# Patient Record
Sex: FEMALE | Race: WHITE | NOT HISPANIC OR LATINO | Employment: FULL TIME | URBAN - METROPOLITAN AREA
[De-identification: names, ages, dates, MRNs, and addresses within clinical notes are randomized per-mention and may not be internally consistent; named-entity substitution may affect disease eponyms.]

---
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
## 2021-01-03 ENCOUNTER — HEALTH MAINTENANCE LETTER (OUTPATIENT)
Age: 21
End: 2021-01-03

## 2021-04-25 ENCOUNTER — HEALTH MAINTENANCE LETTER (OUTPATIENT)
Age: 21
End: 2021-04-25

## 2021-10-05 ENCOUNTER — OFFICE VISIT - RIVER FALLS (OUTPATIENT)
Dept: FAMILY MEDICINE | Facility: CLINIC | Age: 21
End: 2021-10-05

## 2021-10-05 ASSESSMENT — MIFFLIN-ST. JEOR: SCORE: 1670.59

## 2021-10-10 ENCOUNTER — HEALTH MAINTENANCE LETTER (OUTPATIENT)
Age: 21
End: 2021-10-10

## 2022-02-11 VITALS
HEIGHT: 63 IN | OXYGEN SATURATION: 99 % | DIASTOLIC BLOOD PRESSURE: 80 MMHG | RESPIRATION RATE: 16 BRPM | WEIGHT: 207.1 LBS | HEART RATE: 81 BPM | BODY MASS INDEX: 36.7 KG/M2 | SYSTOLIC BLOOD PRESSURE: 120 MMHG

## 2022-02-16 ENCOUNTER — MYC MEDICAL ADVICE (OUTPATIENT)
Dept: CARDIOLOGY | Facility: CLINIC | Age: 22
End: 2022-02-16
Payer: COMMERCIAL

## 2022-02-16 DIAGNOSIS — Q87.40 MARFAN'S SYNDROME: Primary | ICD-10-CM

## 2022-02-16 NOTE — LETTER
(Inserted Image. Unable to display)   October 21, 2021    BETH DE GUZMAN  [address] [place] [phone]            Dear BETH,      Thank you for selecting [facility] for your healthcare needs.    Our records indicate you are due for the following services:     Follow-up office visit.    (FYI   Regarding office visits: In some instances, a video visit or telephone visit may be offered as an option.)      To schedule an appointment or if you have further questions, please contact your clinic at [phone].      Powered by [employer] and [employer]    Sincerely,    Dali Pritchard MD

## 2022-02-16 NOTE — PROGRESS NOTES
Chief Complaint    Physical  History of Present Illness      Pt here today for annual exam      Hx of psych testing suggesting possible adhd and asperger syndrome, would like to try Tx      Review of systems is negative except as per HPI including no fevers, chills, sore throat, runny nose, nausea, vomiting, constipation, diarrhea, rash or new skin lesions, chest pain, palpitations, slurred speech, new paresthesia, shortness of breath or wheeze.             Exam:      see vitals listed below      General: alert and oriented ×3 no acute distress.      HEENT: Normocephalic and atraumatic.       Eyes pupils are equal round and reactive to light extraocular motion is intact. normal conjunctiva      Hearing is grossly normal and there is no otorrhea. Tympanic membranes are pearly grey with a normal light reflex.      Nares are patent there is no rhinorrhea.       Mucous membranes are moist and pink.      Chest: has bilateral rise with no increased work of breathing. clear to auscultation without wheezes, rhonchi, or rales.      Cardiovascular: normal perfusion and brisk capillary refill. S1S2 with regular rate and rhythm and no murmurs, gallops or rubs.      Musculoskeletal: no gross focal abnormalities and normal gait.      Neuro: no gross focal abnormalities and memory seems intact.  CN 2-12 are grossly intact.      Psychiatric: speech is clear and coherent and fluent. Patient dressed appropriately for the weather. Mood is appropriate and affect is full.      Abd: no rebound or guarding, normal BS      Skin: no rash or lesion identified      Discussed:      using sunscreen, protecting from sunburn,      taking folic acid 400 mcg daily      refer to usdachooseplate.gov, AHA and ADA for diet and exercise recommendations      consume [id number] mg calcium daily      regular self skin checks      get 150min /week of aerobic exercise         Physical Exam   Vitals & Measurements    HR: 81 (Peripheral)  RR: 16  BP: 120/80   SpO2: 99%     HT: 62.5 in  WT: 207.1 lb  BMI: 37.27   Assessment/Plan       ADHD (attention deficit hyperactivity disorder), inattentive type (F90.0)         Ordered:          atomoxetine, See Instructions, Instructions: 1 cap(s) Oral qam x 1 week then increase to BID, # 60 EA, 1 Refill(s), Type: Maintenance, (Ordered)          Referral (Request), 10/05/21 11:55:00 CDT, Referred to: Behavioral Health, Referred to: Elena Reynoso, ADHD (attention deficit hyperactivity disorder), inattentive type  Asperger's syndrome          Return to Clinic (Request), RFV: follow up mood disorder, adhd and BP, Return in 2 weeks                Asperger's syndrome (F84.5)         Ordered:          Referral (Request), 10/05/21 11:55:00 CDT, Referred to: Behavioral Health, Referred to: Elena Reynoso, ADHD (attention deficit hyperactivity disorder), inattentive type  Asperger's syndrome                Major depression (F32.9)                Orders:         losartan, = 1 tab(s) ( 25 mg ), Oral, daily, # 90 tab(s), 1 Refill(s), Type: Maintenance, Pharmacy: [facility], 1 tab(s) Oral daily, 62.5, in, 10/05/21 10:51:00 CDT, Height Measured, 207.1, lb, 10/05/21 10:51:00 CDT, Weight Measured, (Ordered)      pt will meet with Elena Reynoso regarding mood and consider referral for adhd testing/neuropsych testing, pt will see if she can get a copy of old results,      she has not been taking her losartan or concerta, has a hard time with meds she will try the strattera before adding back the losartan, and we are up in about 2 weeks  Patient Information     Name:BETH DE GUZMAN      Address:      [address] [place] [id number]     Sex:Female     YOB: 2000     [phone]     Emergency Contact:CHAITANYA DE GUZMAN     MRN:[id number]     FIN:[id number]     Location:[facility]     Date of Service:10/05/2021      Primary Care Physician:       NONE ,       Attending Physician:       Macho DALE,  Dali, [phone]  Problem List/Past Medical History    Ongoing     ADHD (attention deficit hyperactivity disorder), inattentive type     Asperger's syndrome     Marfan syndrome     Obesity    Historical     No qualifying data  Medications    losartan 25 mg oral tablet, 25 mg= 1 tab(s), Oral, daily, 1 refills    methylphenidate 54 mg/24 hr oral tablet, extended release, 54 mg= 1 tab(s), Oral, qam    Mirena 52 mg intrauterine device, 52 mg= 1 EA, Intrauteral, once    Strattera 40 mg oral capsule, See Instructions, 1 refills  Allergies    methylphenidate derivative (Nausea, Headache)  Social History     Electronic Cigarette/Vaping      Electronic Cigarette Use: Never.     Tobacco      Never (less than 100 in lifetime)  Immunizations   No Immunizations recorded for patient.

## 2022-02-16 NOTE — NURSING NOTE
Depression Screening Entered On:  10/21/2021 7:33 AM CDT    Performed On:  10/5/2021 7:33 AM CDT by Yanni Du               Depression Screening   Little Interest - Pleasure in Activities :   Several days   Feeling Down, Depressed, Hopeless :   Several days   Initial Depression Screen Score :   2 Score   Poor Appetite or Overeating :   Nearly every day   Trouble Falling or Staying Asleep :   More than half the days   Feeling Tired or Little Energy :   More than half the days   Feeling Bad About Yourself :   More than half the days   Trouble Concentrating :   Several days   Moving or Speaking Slowly :   More than half the days   Thoughts Better Off Dead or Hurting Self :   Not at all   Difficulty at Work, Home, Getting Along :   Somewhat difficult   Detailed Depression Screen Score :   12    Total Depression Screen Score :   14    Yanni Du - 10/21/2021 7:33 AM CDT

## 2022-02-16 NOTE — NURSING NOTE
Comprehensive Intake Entered On:  10/5/2021 10:58 AM CDT    Performed On:  10/5/2021 10:51 AM CDT by Veronika Castellanos               Summary   Chief Complaint :   Physical   Additonal Information :   IUD Mirena   Weight Measured :   207.1 lb(Converted to: 207 lb 2 oz, 93.939 kg)    Height Measured :   62.5 in(Converted to: 5 ft 2 in, 158.75 cm)    Body Mass Index :   37.27 kg/m2 (HI)    Body Surface Area :   2.03 m2   Systolic Blood Pressure :   120 mmHg   Diastolic Blood Pressure :   80 mmHg   Mean Arterial Pressure :   93 mmHg   Peripheral Pulse Rate :   81 bpm   BP Site :   Right arm   BP Method :   Manual   Respiratory Rate :   16 br/min   Oxygen Saturation :   99 %   Veronika Castellanos - 10/5/2021 10:51 AM CDT   Health Status   Allergies Verified? :   Yes   Medication History Verified? :   Yes   Pre-Visit Planning Status :   Completed   Veronika Castellanos - 10/5/2021 10:51 AM CDT   Consents   Consent for Immunization Exchange :   Consent Granted   Consent for Immunizations to Providers :   Consent Granted   Veronika Castellanos - 10/5/2021 10:51 AM CDT   Meds / Allergies   (As Of: 10/5/2021 10:58:33 AM CDT)   Allergies (Active)   No Known Medication Allergies  Estimated Onset Date:   Unspecified ; Created By:   Veronika Castellanos; Reaction Status:   Active ; Category:   Drug ; Substance:   No Known Medication Allergies ; Type:   Allergy ; Updated By:   Veronika Castellanos; Reviewed Date:   10/5/2021 10:55 AM CDT        Medication List   (As Of: 10/5/2021 10:58:33 AM CDT)        Social History   Social History   (As Of: 10/5/2021 10:58:33 AM CDT)   Tobacco:        Never (less than 100 in lifetime)   (Last Updated: 10/5/2021 10:54:46 AM CDT by Veronika Castellanos)          Electronic Cigarette/Vaping:        Electronic Cigarette Use: Never.   (Last Updated: 10/5/2021 10:54:49 AM CDT by Veronika Castellanos)

## 2022-02-16 NOTE — NURSING NOTE
CAGE Assessment Entered On:  10/21/2021 7:34 AM CDT    Performed On:  10/5/2021 7:33 AM CDT by Yanni Du               Assessment   Have you ever felt you should cut down on your drinking :   No   Have people annoyed you by criticizing your drinking :   No   Have you ever felt bad or guilty about your drinking :   No   Have you ever taken a drink first thing in the morning to steady your nerves or get rid of a hangover (Eye-opener) :   No   CAGE Score :   0    Yanni Du - 10/21/2021 7:33 AM CDT

## 2022-02-17 NOTE — TELEPHONE ENCOUNTER
Date: 2/17/2022    Time of Call: 11:53 AM     Diagnosis:  Marfans syndrome     [ TORB ] Ordering provider:  March  Order: TSH labs     Order received by: Emmanuelle Bennett RN     Follow-up/additional notes: added on to future labs per patients request

## 2022-02-22 ENCOUNTER — LAB (OUTPATIENT)
Dept: LAB | Facility: CLINIC | Age: 22
End: 2022-02-22
Payer: COMMERCIAL

## 2022-02-22 DIAGNOSIS — Q87.40 MARFAN'S SYNDROME: ICD-10-CM

## 2022-02-22 LAB
ANION GAP SERPL CALCULATED.3IONS-SCNC: 4 MMOL/L (ref 3–14)
BUN SERPL-MCNC: 12 MG/DL (ref 7–30)
CALCIUM SERPL-MCNC: 9.7 MG/DL (ref 8.5–10.1)
CHLORIDE BLD-SCNC: 108 MMOL/L (ref 94–109)
CO2 SERPL-SCNC: 28 MMOL/L (ref 20–32)
CREAT SERPL-MCNC: 0.7 MG/DL (ref 0.52–1.04)
GFR SERPL CREATININE-BSD FRML MDRD: >90 ML/MIN/1.73M2
GLUCOSE BLD-MCNC: 98 MG/DL (ref 70–99)
POTASSIUM BLD-SCNC: 4.5 MMOL/L (ref 3.4–5.3)
SODIUM SERPL-SCNC: 140 MMOL/L (ref 133–144)
TSH SERPL DL<=0.005 MIU/L-ACNC: 2.12 MU/L (ref 0.4–4)

## 2022-02-22 PROCEDURE — 84443 ASSAY THYROID STIM HORMONE: CPT

## 2022-02-22 PROCEDURE — 80048 BASIC METABOLIC PNL TOTAL CA: CPT

## 2022-02-22 PROCEDURE — 36415 COLL VENOUS BLD VENIPUNCTURE: CPT

## 2022-03-18 ENCOUNTER — ANCILLARY PROCEDURE (OUTPATIENT)
Dept: CARDIOLOGY | Facility: CLINIC | Age: 22
End: 2022-03-18
Attending: INTERNAL MEDICINE
Payer: COMMERCIAL

## 2022-03-18 ENCOUNTER — OFFICE VISIT (OUTPATIENT)
Dept: CARDIOLOGY | Facility: CLINIC | Age: 22
End: 2022-03-18
Attending: DERMATOLOGY
Payer: COMMERCIAL

## 2022-03-18 VITALS
BODY MASS INDEX: 38.16 KG/M2 | DIASTOLIC BLOOD PRESSURE: 78 MMHG | HEART RATE: 78 BPM | SYSTOLIC BLOOD PRESSURE: 118 MMHG | HEIGHT: 62 IN | OXYGEN SATURATION: 95 % | WEIGHT: 207.4 LBS

## 2022-03-18 DIAGNOSIS — Q87.40 MARFAN'S SYNDROME: ICD-10-CM

## 2022-03-18 DIAGNOSIS — F32.9 CURRENT EPISODE OF MAJOR DEPRESSIVE DISORDER WITHOUT PRIOR EPISODE, UNSPECIFIED DEPRESSION EPISODE SEVERITY: ICD-10-CM

## 2022-03-18 DIAGNOSIS — E66.812 CLASS 2 OBESITY WITHOUT SERIOUS COMORBIDITY WITH BODY MASS INDEX (BMI) OF 37.0 TO 37.9 IN ADULT, UNSPECIFIED OBESITY TYPE: Primary | ICD-10-CM

## 2022-03-18 LAB — LVEF ECHO: NORMAL

## 2022-03-18 PROCEDURE — 93306 TTE W/DOPPLER COMPLETE: CPT | Mod: GC | Performed by: INTERNAL MEDICINE

## 2022-03-18 PROCEDURE — 99215 OFFICE O/P EST HI 40 MIN: CPT | Mod: 25 | Performed by: INTERNAL MEDICINE

## 2022-03-18 PROCEDURE — G0463 HOSPITAL OUTPT CLINIC VISIT: HCPCS

## 2022-03-18 ASSESSMENT — PAIN SCALES - GENERAL: PAINLEVEL: NO PAIN (0)

## 2022-03-18 NOTE — NURSING NOTE
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 presenting for follow up.   Vitals were taken and medications reconciled.    Jonathan Neff, EMT  9:51 AM

## 2022-03-18 NOTE — PROGRESS NOTES
CARDIOLOGY CONSULTATION:    Ms. Rich is a delightful 21-year-old woman who is known to me.   She has a history of FBN1 mutation.  We also follow some of her siblings as well.  She had been on losartan 25 mg daily.     Gill was fairly active with sports in high school but has not been as active.  She does have confirmed family members with FBN1 mutations including her sisters, Kerline and Jenae, and her brother, Cory, her father and 2 paternal uncles.  Her mom has multiple sclerosis.      Gill's echo from january 2020 was read as an increase in her aortic dimmension, though it still indexed within normal limits, her sinuses of Valsalva had supposedly increased from 2.7 to 2.9 and her ascending from 2.6 to 3 cm.  We did an MRA 1/6/2020 and the sinuses measured 2.7 and ascending 2.4 CM. Echo 8/2020 sinuses measured 2.6 cm and ascending 2.6 cm.  Echo from 3/2022 sinuses measured 2.7 and ascending 3.0-3.3 (those images are not uploaded).  We are planning to do an MRA now.     Gill is now working at [employer] and is planning to move home.  She is depressed.  She has gained 20 pounds since 8/2020.  She was on ritalin in highschool and stopped that when she went to college and that could be playing a role in her weight gain (was 155 7/2018) that is contributing to her depression    PAST MEDICAL HISTORY:  No past medical history on file.    CURRENT MEDICATIONS:  Current Outpatient Medications   Medication Sig Dispense Refill     levonorgestrel (MIRENA, 52 MG,) 20 MCG/24HR IUD 52 mg       losartan (COZAAR) 25 MG tablet Take 1 tablet (25 mg) by mouth daily 90 tablet 3     methylphenidate (RITALIN) 20 MG tablet 20 mg daily (Patient not taking: Reported on 3/18/2022)  0       PAST SURGICAL HISTORY:  No past surgical history on file.    ALLERGIES  Methylphenidate derivatives    FAMILY HX:  No family history on file.    SOCIAL HX:  Social History     Socioeconomic History     Marital status: Single     Spouse name: None     Number  "of children: None     Years of education: None     Highest education level: None   Occupational History     None   Tobacco Use     Smoking status: Never Smoker     Smokeless tobacco: Never Used   Substance and Sexual Activity     Alcohol use: None     Drug use: None     Sexual activity: None   Other Topics Concern     None   Social History Narrative     None     Social Determinants of Health     Financial Resource Strain: Not on file   Food Insecurity: Not on file   Transportation Needs: Not on file   Physical Activity: Not on file   Stress: Not on file   Social Connections: Not on file   Intimate Partner Violence: Not on file   Housing Stability: Not on file       ROS:  Constitutional: No fever, chills, or sweats. No weight gain/loss.   ENT: No visual disturbance, ear ache, epistaxis, sore throat.   Allergies/Immunologic: Negative.   Respiratory: No cough, hemoptysis.   Cardiovascular: As per HPI.   GI: No nausea, vomiting, hematemesis, melena, or hematochezia.   : No urinary frequency, dysuria, or hematuria.   Integument: Negative.   Psychiatric: Negative.   Neuro: Negative.   Endocrinology: Negative.   Musculoskeletal: No myalgia.    VITAL SIGNS:  /78 (BP Location: Right arm, Patient Position: Sitting, Cuff Size: Adult Large)   Pulse 78   Ht 1.574 m (5' 1.97\")   Wt 94.1 kg (207 lb 6.4 oz)   SpO2 95%   BMI 37.97 kg/m    Body mass index is 37.97 kg/m .  Wt Readings from Last 2 Encounters:   03/18/22 94.1 kg (207 lb 6.4 oz)   10/05/21 93.9 kg (207 lb 1.6 oz)       PHYSICAL EXAM  Gill Rich IS A 21 year old female.in no acute distress.  HEENT: Unremarkable.  Neck: JVP normal.  Carotids +4/4 bilaterally without bruits.  Lungs: CTA.  Cor: RRR. Normal S1 and S2.  Abd: Soft, nontender, nondistended.  Extremities: No C/C/E.    LABS    Lab Results   Component Value Date    WBC 5.0 07/27/2018     Lab Results   Component Value Date    RBC 4.26 07/27/2018     Lab Results   Component Value Date    HGB 12.6 " 07/27/2018     Lab Results   Component Value Date    HCT 38.0 07/27/2018     No components found for: MCT  Lab Results   Component Value Date    MCV 89 07/27/2018     Lab Results   Component Value Date    MCH 29.6 07/27/2018     Lab Results   Component Value Date    MCHC 33.2 07/27/2018     Lab Results   Component Value Date    RDW 11.9 07/27/2018     Lab Results   Component Value Date     07/27/2018      Recent Labs   Lab Test 02/22/22  1134 07/27/18  1330    138   POTASSIUM 4.5 4.6   CHLORIDE 108 104   CO2 28 27   ANIONGAP 4 6   GLC 98 84   BUN 12 12   CR 0.70 0.70   FRAN 9.7 9.3     Recent Labs   Lab Test 01/03/20  1402 07/27/18  1330   CHOL 235* 205*   HDL 87 79   * 113*   TRIG 61 66   NHDL 148* 126*        IMPRESSION, REPORT, PLAN:   1.  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   2.  Sinus of Valsalva 2.7 CM.  Ascending aorta on echo 3.0 CM (doing MRA to check measurement)  3.  Weight gain  4.  Depression       DISCUSSION:  It was a pleasure being involved in the care of Ms. Rich.  We discussed that we will plan to do an MRA to clarify the aorta measurements as the echo was not satisfactory.  The BP is well controlled and will continue with Cozaar.    Her depression is significant and multifactorial.  After discussion put in psychology referral and Prozac 20 mg daily.  Also put in referral for nutrition and weight management.  I will plan to see her back in 4-6 weeks.      All questions were answered.  It was a pleasure to see her.  Please do not hesitate to contact me with any questions or concerns.         MARGARITA MARTINEZ MD    50 minutes face-face, documentation and review of records on day of visit

## 2022-03-18 NOTE — NURSING NOTE
Cardiac Testing: Patient given instructions regarding  MRA. Discussed purpose, preparation, procedure and when to expect results reported back to the patient. Patient demonstrated understanding of this information and agreed to call with further questions or concerns.  Med Reconcile: Reviewed and verified all current medications with the patient. The updated medication list was printed and given to the patient.    New Medication: Patient was educated regarding newly prescribed medication, including discussion of  the indication, administration, side effects, and when to report to MD or RN. Patient demonstrated understanding of this information and agreed to call with further questions or concerns.    Return Appointment: Patient given instructions regarding scheduling next clinic visit. Patient demonstrated understanding of this information and agreed to call with further questions or concerns.    Patient stated she understood all health information given and agreed to call with further questions or concerns.

## 2022-03-18 NOTE — PATIENT INSTRUCTIONS
You were seen today in the Adult Congenital and Cardiovascular Genetics Clinic at the [facility].    Cardiology Providers you saw during your visit:  RASTA Hicks MD    Diagnosis:  marfans    Results:  RASTA Hicks MD reviewed the results of your echo and lab testing today in clinic.    Recommendations:    1. Continue to eat a heart healthy, low salt diet.  2. Continue to get 20-30 minutes of aerobic activity, 4-5 days per week.  Examples of aerobic activity include walking, running, swimming, cycling, etc.  3. Continue to observe good oral hygiene, with regular dental visits.  4. MRA is scheduled, we need a better view of aorta than the echo      SBE prophylaxis:   Yes____  No__x__    Lifelong Bacterial Endocarditis Prophylaxis:  YES____  NO____    If YES is checked, follow the recommendations outlined below:  1. Take antibiotic(s) prior to recommended dental procedures and procedures on the respiratory tract or with infected skin, muscle or bones. SBE prophylaxis is not needed for routine GI and  procedures (ie. Colonoscopy or vaginal delivery)  2. Observe good oral hygiene daily, as advised by your dentist. Get regular professional dental care.  3. Keep cuts clean.  4. Infections should be treated promptly.  5. Symptoms of Infective Endocarditis could include: fever lasting more than 4-5 days or a recurrent fever that initially resolves but returns within 1-2 days)      Exercise restrictions:   Yes__X__  No____         If yes, list restrictions:  Must be allowed to rest if fatigued or SOB      Work restrictions:  Yes____  No_X___         If yes, list restrictions:    FASTING CHOLESTEROL was checked in the last 5 years YES___  NO___ (2020)  Continue to eat a heart healthy, low salt diet.         ____ Fasting lipid panel order today         ____ No changes in medications          ____ I recommend the following changes in your cholesterol medications.:          ____ Please follow up for  cholesterol screening at your primary care physician      Follow-up:  Follow up in 6 weeks with Dr. Hicks    If you have questions or concerns please contact us at:    LEXX SeguraN, RN    Kanchan Thakkar (Scheduling)  Nurse Care Coordinator     Clinic   Adult Congenital and CV Genetics   Adult Congenital and CV Genetic  [facility]  (P) [phone]     (P) [phone]         (F) [phone]        For after hours urgent needs, call [phone] and ask to speak to the Adult Congenital Physician on call.  Mention Job Code 0401.    For emergencies call 911.    [facility]  Mail Code [id number]  [address] [place]  [address]

## 2022-04-18 ENCOUNTER — HOSPITAL ENCOUNTER (OUTPATIENT)
Dept: MRI IMAGING | Facility: CLINIC | Age: 22
Discharge: HOME | End: 2022-04-18
Attending: INTERNAL MEDICINE | Admitting: INTERNAL MEDICINE
Payer: COMMERCIAL

## 2022-04-18 DIAGNOSIS — Q87.40 MARFAN'S SYNDROME: ICD-10-CM

## 2022-04-18 DIAGNOSIS — E66.812 CLASS 2 OBESITY WITHOUT SERIOUS COMORBIDITY WITH BODY MASS INDEX (BMI) OF 37.0 TO 37.9 IN ADULT, UNSPECIFIED OBESITY TYPE: ICD-10-CM

## 2022-04-18 DIAGNOSIS — F32.9 CURRENT EPISODE OF MAJOR DEPRESSIVE DISORDER WITHOUT PRIOR EPISODE, UNSPECIFIED DEPRESSION EPISODE SEVERITY: ICD-10-CM

## 2022-04-18 PROCEDURE — 71555 MRI ANGIO CHEST W OR W/O DYE: CPT

## 2022-04-18 PROCEDURE — 71555 MRI ANGIO CHEST W OR W/O DYE: CPT | Mod: 26 | Performed by: RADIOLOGY

## 2022-04-18 PROCEDURE — 255N000002 HC RX 255 OP 636: Performed by: INTERNAL MEDICINE

## 2022-04-18 PROCEDURE — A9585 GADOBUTROL INJECTION: HCPCS | Performed by: INTERNAL MEDICINE

## 2022-04-18 RX ORDER — GADOBUTROL 604.72 MG/ML
10 INJECTION INTRAVENOUS ONCE
Status: COMPLETED | OUTPATIENT
Start: 2022-04-18 | End: 2022-04-18

## 2022-04-18 RX ADMIN — GADOBUTROL 10 ML: 604.72 INJECTION INTRAVENOUS at 15:27

## 2022-05-20 ENCOUNTER — OFFICE VISIT (OUTPATIENT)
Dept: CARDIOLOGY | Facility: CLINIC | Age: 22
End: 2022-05-20
Attending: INTERNAL MEDICINE
Payer: COMMERCIAL

## 2022-05-20 ENCOUNTER — TELEPHONE (OUTPATIENT)
Dept: BEHAVIORAL HEALTH | Facility: CLINIC | Age: 22
End: 2022-05-20
Payer: COMMERCIAL

## 2022-05-20 VITALS
HEIGHT: 63 IN | DIASTOLIC BLOOD PRESSURE: 70 MMHG | HEART RATE: 71 BPM | WEIGHT: 212.5 LBS | OXYGEN SATURATION: 95 % | BODY MASS INDEX: 37.65 KG/M2 | SYSTOLIC BLOOD PRESSURE: 110 MMHG

## 2022-05-20 DIAGNOSIS — Q87.40 MARFAN SYNDROME: ICD-10-CM

## 2022-05-20 DIAGNOSIS — F32.9 CURRENT EPISODE OF MAJOR DEPRESSIVE DISORDER WITHOUT PRIOR EPISODE, UNSPECIFIED DEPRESSION EPISODE SEVERITY: ICD-10-CM

## 2022-05-20 DIAGNOSIS — E66.812 CLASS 2 OBESITY WITHOUT SERIOUS COMORBIDITY WITH BODY MASS INDEX (BMI) OF 37.0 TO 37.9 IN ADULT, UNSPECIFIED OBESITY TYPE: ICD-10-CM

## 2022-05-20 DIAGNOSIS — Q87.40 MARFAN'S SYNDROME: Primary | ICD-10-CM

## 2022-05-20 PROCEDURE — 93005 ELECTROCARDIOGRAM TRACING: CPT

## 2022-05-20 PROCEDURE — 99214 OFFICE O/P EST MOD 30 MIN: CPT | Performed by: INTERNAL MEDICINE

## 2022-05-20 PROCEDURE — G0463 HOSPITAL OUTPT CLINIC VISIT: HCPCS | Mod: 25

## 2022-05-20 RX ORDER — LOSARTAN POTASSIUM 25 MG/1
25 TABLET ORAL DAILY
Qty: 90 TABLET | Refills: 3 | Status: SHIPPED | OUTPATIENT
Start: 2022-05-20 | End: 2023-01-31

## 2022-05-20 ASSESSMENT — PAIN SCALES - GENERAL: PAINLEVEL: NO PAIN (0)

## 2022-05-20 NOTE — PROGRESS NOTES
CARDIOLOGY CONSULTATION:    Ms. Rich is a delightful 22-year-old woman who is known to me.   She has a history of FBN1 mutation.  We also follow some of her siblings as well.  She had been on losartan 25 mg daily.     Gill was fairly active with sports in high school but has not been as active. She does have confirmed family members with FBN1 mutations including her sisters, Kerline and Jenae, and her brother, Cory, her father and 2 paternal uncles.  Her mom has multiple sclerosis.       MRA 1/6/2020 and the sinuses measured 2.7 and ascending 2.4 CM. We repeated an MRA 4/2022 and her sinuses are 3.0 and ascending aorta 2.7 CM.  We are planning to repeat her MRA next year.       When I saw Gill in March 2022 she was going through some life changes and depression; she had gained some weight and was moving home.  She had some untreated anxiety as well.  We started Prozac 20mg daily and she feels this is helping.  She is switching jobs from [employer] to [employer].    She has not yet met with psychology and nutrition.  Hopes to start walking. BP is controlled. Needs refill on Losartan.     PAST MEDICAL HISTORY:  No past medical history on file.    CURRENT MEDICATIONS:  Current Outpatient Medications   Medication Sig Dispense Refill     FLUoxetine (PROZAC) 20 MG capsule Take 1 capsule (20 mg) by mouth daily 90 capsule 3     levonorgestrel (MIRENA, 52 MG,) 20 MCG/24HR IUD 52 mg       losartan (COZAAR) 25 MG tablet Take 1 tablet (25 mg) by mouth daily 90 tablet 3     methylphenidate (RITALIN) 20 MG tablet 20 mg daily (Patient not taking: No sig reported)  0       PAST SURGICAL HISTORY:  No past surgical history on file.    ALLERGIES  Methylphenidate derivatives    FAMILY HX:  No family history on file.    SOCIAL HX:  Social History     Socioeconomic History     Marital status: Single     Spouse name: None     Number of children: None     Years of education: None     Highest education level: None   Tobacco Use     Smoking  "status: Never Smoker     Smokeless tobacco: Never Used       ROS:  Constitutional: No fever, chills, or sweats. No weight gain/loss.   ENT: No visual disturbance, ear ache, epistaxis, sore throat.   Allergies/Immunologic: Negative.   Respiratory: No cough, hemoptysis.   Cardiovascular: As per HPI.   GI: No nausea, vomiting, hematemesis, melena, or hematochezia.   : No urinary frequency, dysuria, or hematuria.   Integument: Negative.   Psychiatric: Negative.   Neuro: Negative.   Endocrinology: Negative.   Musculoskeletal: No myalgia.    VITAL SIGNS:  /70 (BP Location: Right arm, Patient Position: Chair, Cuff Size: Adult Large)   Pulse 71   Ht 1.588 m (5' 2.5\")   Wt 96.4 kg (212 lb 8 oz)   SpO2 95%   BMI 38.25 kg/m    Body mass index is 38.25 kg/m .  Wt Readings from Last 2 Encounters:   05/20/22 96.4 kg (212 lb 8 oz)   03/18/22 94.1 kg (207 lb 6.4 oz)       PHYSICAL EXAM  Gill Rich IS A 22 year old female.in no acute distress.     LABS    Lab Results   Component Value Date    WBC 5.0 07/27/2018     Lab Results   Component Value Date    RBC 4.26 07/27/2018     Lab Results   Component Value Date    HGB 12.6 07/27/2018     Lab Results   Component Value Date    HCT 38.0 07/27/2018     No components found for: MCT  Lab Results   Component Value Date    MCV 89 07/27/2018     Lab Results   Component Value Date    MCH 29.6 07/27/2018     Lab Results   Component Value Date    MCHC 33.2 07/27/2018     Lab Results   Component Value Date    RDW 11.9 07/27/2018     Lab Results   Component Value Date     07/27/2018      Recent Labs   Lab Test 02/22/22  1134 07/27/18  1330    138   POTASSIUM 4.5 4.6   CHLORIDE 108 104   CO2 28 27   ANIONGAP 4 6   GLC 98 84   BUN 12 12   CR 0.70 0.70   FRAN 9.7 9.3     Recent Labs   Lab Test 01/03/20  1402 07/27/18  1330   CHOL 235* 205*   HDL 87 79   * 113*   TRIG 61 66   NHDL 148* 126*         IMPRESSION, REPORT, PLAN:   1.  Marfan syndrome (carrier of FBN1 " mutation with multiple family members also carriers).   2.  Sinus of Valsalva 3 CM.  Ascending aorta 2.7 CM MRA 4/2022  3.  Obesity  4.  Depression/Anxiety        DISCUSSION:  It was a pleasure being involved in the care of Ms. Rich. We discussed that MRA shows that the aorta has increased in size a bit in 30 months, but that her dimensions are still within normal limits.  We will plan to repeat her MRA again in a year.     The BP is well controlled and will continue with Cozaar.     Her depression and anxiety are improved on Prozac, which we will continue.  She will follow up with the appointments with weight management and psychology.      Will see her back in the fall to check in.       All questions were answered.  It was a pleasure to see her.  Please do not hesitate to contact me with any questions or concerns.         MARGARITA MARTINEZ MD    30 minutes face-face, documentation and review of records on day of visit

## 2022-05-20 NOTE — LETTER
5/20/2022      RE: Gill Rich  [address]  [facility] [address]       Dear Colleague,    Thank you for the opportunity to participate in the care of your patient, Gill Rich, at the [facility] [place] at [facility]. Please see a copy of my visit note below.    CARDIOLOGY CONSULTATION:    Ms. Rich is a delightful 22-year-old woman who is known to me.   She has a history of FBN1 mutation.  We also follow some of her siblings as well.  She had been on losartan 25 mg daily.     Gill was fairly active with sports in high school but has not been as active. She does have confirmed family members with FBN1 mutations including her sisters, Kerline and Jenae, and her brother, Cory, her father and 2 paternal uncles.  Her mom has multiple sclerosis.       MRA 1/6/2020 and the sinuses measured 2.7 and ascending 2.4 CM. We repeated an MRA 4/2022 and her sinuses are 3.0 and ascending aorta 2.7 CM.  We are planning to repeat her MRA next year.       When I saw Gill in March 2022 she was going through some life changes and depression; she had gained some weight and was moving home.  She had some untreated anxiety as well.  We started Prozac 20mg daily and she feels this is helping.  She is switching jobs from [employer] to [employer].    She has not yet met with psychology and nutrition.  Hopes to start walking. BP is controlled. Needs refill on Losartan.     PAST MEDICAL HISTORY:  No past medical history on file.    CURRENT MEDICATIONS:  Current Outpatient Medications   Medication Sig Dispense Refill     FLUoxetine (PROZAC) 20 MG capsule Take 1 capsule (20 mg) by mouth daily 90 capsule 3     levonorgestrel (MIRENA, 52 MG,) 20 MCG/24HR IUD 52 mg       losartan (COZAAR) 25 MG tablet Take 1 tablet (25 mg) by mouth daily 90 tablet 3     methylphenidate (RITALIN) 20 MG tablet 20 mg daily (Patient not taking: No sig reported)  0       PAST SURGICAL HISTORY:  No  "past surgical history on file.    ALLERGIES  Methylphenidate derivatives    FAMILY HX:  No family history on file.    SOCIAL HX:  Social History     Socioeconomic History     Marital status: Single     Spouse name: None     Number of children: None     Years of education: None     Highest education level: None   Tobacco Use     Smoking status: Never Smoker     Smokeless tobacco: Never Used       ROS:  Constitutional: No fever, chills, or sweats. No weight gain/loss.   ENT: No visual disturbance, ear ache, epistaxis, sore throat.   Allergies/Immunologic: Negative.   Respiratory: No cough, hemoptysis.   Cardiovascular: As per HPI.   GI: No nausea, vomiting, hematemesis, melena, or hematochezia.   : No urinary frequency, dysuria, or hematuria.   Integument: Negative.   Psychiatric: Negative.   Neuro: Negative.   Endocrinology: Negative.   Musculoskeletal: No myalgia.    VITAL SIGNS:  /70 (BP Location: Right arm, Patient Position: Chair, Cuff Size: Adult Large)   Pulse 71   Ht 1.588 m (5' 2.5\")   Wt 96.4 kg (212 lb 8 oz)   SpO2 95%   BMI 38.25 kg/m    Body mass index is 38.25 kg/m .  Wt Readings from Last 2 Encounters:   05/20/22 96.4 kg (212 lb 8 oz)   03/18/22 94.1 kg (207 lb 6.4 oz)       PHYSICAL EXAM  Gill Rich IS A 22 year old female.in no acute distress.     LABS    Lab Results   Component Value Date    WBC 5.0 07/27/2018     Lab Results   Component Value Date    RBC 4.26 07/27/2018     Lab Results   Component Value Date    HGB 12.6 07/27/2018     Lab Results   Component Value Date    HCT 38.0 07/27/2018     No components found for: MCT  Lab Results   Component Value Date    MCV 89 07/27/2018     Lab Results   Component Value Date    MCH 29.6 07/27/2018     Lab Results   Component Value Date    MCHC 33.2 07/27/2018     Lab Results   Component Value Date    RDW 11.9 07/27/2018     Lab Results   Component Value Date     07/27/2018      Recent Labs   Lab Test 02/22/22  1134 07/27/18  1330 "    138   POTASSIUM 4.5 4.6   CHLORIDE 108 104   CO2 28 27   ANIONGAP 4 6   GLC 98 84   BUN 12 12   CR 0.70 0.70   FRAN 9.7 9.3     Recent Labs   Lab Test 01/03/20  1402 07/27/18  1330   CHOL 235* 205*   HDL 87 79   * 113*   TRIG 61 66   NHDL 148* 126*         IMPRESSION, REPORT, PLAN:   1.  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   2.  Sinus of Valsalva 3 CM.  Ascending aorta 2.7 CM MRA 4/2022  3.  Obesity  4.  Depression/Anxiety        DISCUSSION:  It was a pleasure being involved in the care of Ms. Rich. We discussed that MRA shows that the aorta has increased in size a bit in 30 months, but that her dimensions are still within normal limits.  We will plan to repeat her MRA again in a year.     The BP is well controlled and will continue with Cozaar.     Her depression and anxiety are improved on Prozac, which we will continue.  She will follow up with the appointments with weight management and psychology.      Will see her back in the fall to check in.       All questions were answered.  It was a pleasure to see her.  Please do not hesitate to contact me with any questions or concerns.         MARGARITA MARTINEZ MD    30 minutes face-face, documentation and review of records on day of visit

## 2022-05-20 NOTE — TELEPHONE ENCOUNTER
First attempt to contact pt. Writer left a VM with TC contact info and encouraged a phone call back to schedule initial therapy appointment. Writer will postpone for tomorrow.    Dana Barry  05/20/22  218    ----- Message from Arnulfo Smith sent at 5/20/2022 11:44 AM CDT -----  Regarding: transition clinic referral  Transition Clinic Referral   [place] Only   Limited [place] Availability    Type of Referral:      __X__Therapy  _____Therapy & Medication (Psychiatry next level of care appointment needs to be scheduled)  _____Medication Only (Psychiatry next level of care appointment needs to be scheduled)  _____Diagnostic Assessment Only      Referring Provider Name: Arnulfo Smith    Clinician completing the assessment.     Referring Provider: OTHER: OP Intake    If known, referring provider Phone Number: [phone]  Service Line/Location: Op Intake    Reason for Transition Clinic Referral: Pt has a referral for therapy. Please help bridge the wait until they can get in. Thanks.    Next Level of Care Patient Will Be Transitioned To: Ed Toscano Department: [facility]     Start Date for Next Level of Care Therapy (Required): 10/24/22  Provider Ed Toscano    Location Department: [facility]    Start Date for Next Level of Care Medication (Required): N/A  Provider  Location   Psychiatry cannot see patients who do not have active medical insurance    What Would Be Helpful from the Transition Clinic: Pt has a referral for therapy. Please help bridge the wait until they can get in. Thanks.     Needs: NO    Does Patient Have Access to Technology: yes    Patient E-mail Address: [email or web address]    Current Patient Phone Number: [phone];     Clinician Gender Preference (if applicable): NO    Arnulfo Smith

## 2022-05-20 NOTE — NURSING NOTE
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 presenting for follow up.     Vitals were taken and medications were reconciled. EKG was performed   BENITEZ Madsen  9:53 AM

## 2022-05-20 NOTE — PATIENT INSTRUCTIONS
You were seen today in the Adult Congenital and Cardiovascular Genetics Clinic at the [facility].    Cardiology Providers you saw during your visit:  RASTA Hicks MD    Diagnosis:  Marfans    Results:  RASTA Hicks MD reviewed the results of your EKG, 2 flight stair test,  echo, MRA and lab testing today in clinic.    Recommendations:    Continue to eat a heart healthy, low salt diet.  Continue to get 20-30 minutes of aerobic activity, 4-5 days per week.  Examples of aerobic activity include walking, running, swimming, cycling, etc.  Continue to observe good oral hygiene, with regular dental visits.  We refilled your losartan      SBE prophylaxis:   Yes____  No__x__    Lifelong Bacterial Endocarditis Prophylaxis:  YES____  NO____    If YES is checked, follow the recommendations outlined below:  Take antibiotic(s) prior to recommended dental procedures and procedures on the respiratory tract or with infected skin, muscle or bones. SBE prophylaxis is not needed for routine GI and  procedures (ie. Colonoscopy or vaginal delivery)  Observe good oral hygiene daily, as advised by your dentist. Get regular professional dental care.  Keep cuts clean.  Infections should be treated promptly.  Symptoms of Infective Endocarditis could include: fever lasting more than 4-5 days or a recurrent fever that initially resolves but returns within 1-2 days)      Exercise restrictions:   Yes__X__  No____         If yes, list restrictions:  Must be allowed to rest if fatigued or SOB      Work restrictions:  Yes____  No_X___         If yes, list restrictions:    FASTING CHOLESTEROL was checked in the last 5 years YES__x_  NO___ (2020)  Continue to eat a heart healthy, low salt diet.         ____ Fasting lipid panel order today         ____ No changes in medications          ____ I recommend the following changes in your cholesterol medications.:          ____ Please follow up for cholesterol screening at your primary  care physician      Follow-up:  Follow up with Dr. Hicks in Nov/Dec 2022    If you have questions or concerns please contact us at:    LEXX SeguraN, RN    Kanchan Thakkar (Scheduling)  Nurse Care Coordinator     Clinic   Adult Congenital and CV Genetics   Adult Congenital and CV Genetic  [facility]   [facility]  (P) [phone]     (P) [phone]         (F) [phone]        For after hours urgent needs, call [phone] and ask to speak to the Adult Congenital Physician on call.  Mention Job Code 0401.    For emergencies call 911.    [facility]   Clinics and Surgery Center  Mail Code [id number]  [address] [place] [address]  [id number]

## 2022-05-21 ENCOUNTER — HEALTH MAINTENANCE LETTER (OUTPATIENT)
Age: 22
End: 2022-05-21

## 2022-05-21 ENCOUNTER — TELEPHONE (OUTPATIENT)
Dept: BEHAVIORAL HEALTH | Facility: CLINIC | Age: 22
End: 2022-05-21
Payer: COMMERCIAL

## 2022-05-21 LAB
ATRIAL RATE - MUSE: 65 BPM
DIASTOLIC BLOOD PRESSURE - MUSE: NORMAL MMHG
INTERPRETATION ECG - MUSE: NORMAL
P AXIS - MUSE: 27 DEGREES
PR INTERVAL - MUSE: 158 MS
QRS DURATION - MUSE: 96 MS
QT - MUSE: 372 MS
QTC - MUSE: 386 MS
R AXIS - MUSE: 37 DEGREES
SYSTOLIC BLOOD PRESSURE - MUSE: NORMAL MMHG
T AXIS - MUSE: 19 DEGREES
VENTRICULAR RATE- MUSE: 65 BPM

## 2022-05-21 NOTE — TELEPHONE ENCOUNTER
2nd attempt, writer left voicemail and postponed to tomorrow 5.22.      [facility]  Care Coordinator  5.21    ----- Message from Arnulfo Smith sent at 5/20/2022 11:44 AM CDT -----  Regarding: transition clinic referral  Transition Clinic Referral   [place] Only   Limited [place] Availability    Type of Referral:      __X__Therapy  _____Therapy & Medication (Psychiatry next level of care appointment needs to be scheduled)  _____Medication Only (Psychiatry next level of care appointment needs to be scheduled)  _____Diagnostic Assessment Only      Referring Provider Name: Arnulfo Smith    Clinician completing the assessment.     Referring Provider: OTHER: OP Intake    If known, referring provider Phone Number: [phone]  Service Line/Location: Op Intake    Reason for Transition Clinic Referral: Pt has a referral for therapy. Please help bridge the wait until they can get in. Thanks.    Next Level of Care Patient Will Be Transitioned To: Ed Toscano Department: [facility]     Start Date for Next Level of Care Therapy (Required): 10/24/22  Provider Ed Toscano    Location Department: [facility]    Start Date for Next Level of Care Medication (Required): N/A  Provider  Location   Psychiatry cannot see patients who do not have active medical insurance    What Would Be Helpful from the Transition Clinic: Pt has a referral for therapy. Please help bridge the wait until they can get in. Thanks.     Needs: NO    Does Patient Have Access to Technology: yes    Patient E-mail Address: [email or web address]    Current Patient Phone Number: [phone];     Clinician Gender Preference (if applicable): NO    Arnulfo Smith

## 2022-05-22 ENCOUNTER — TELEPHONE (OUTPATIENT)
Dept: BEHAVIORAL HEALTH | Facility: CLINIC | Age: 22
End: 2022-05-22
Payer: COMMERCIAL

## 2022-05-22 NOTE — TELEPHONE ENCOUNTER
3rd attempt made. Writer left  encouraging pt to call back to schedule therapy. [facility]  Care Coordinator  5.22  ----- Message from Arnulfo Smith sent at 5/20/2022 11:44 AM CDT -----  Regarding: transition clinic referral  Transition Clinic Referral   [place] Only   Limited [place] Availability    Type of Referral:      __X__Therapy  _____Therapy & Medication (Psychiatry next level of care appointment needs to be scheduled)  _____Medication Only (Psychiatry next level of care appointment needs to be scheduled)  _____Diagnostic Assessment Only      Referring Provider Name: Arnulfo Smith    Clinician completing the assessment.     Referring Provider: OTHER: OP Intake    If known, referring provider Phone Number: [phone]  Service Line/Location: Op Intake    Reason for Transition Clinic Referral: Pt has a referral for therapy. Please help bridge the wait until they can get in. Thanks.    Next Level of Care Patient Will Be Transitioned To: Ed Toscano Department: [facility]     Start Date for Next Level of Care Therapy (Required): 10/24/22  Provider Ed Toscano    Location Department: [facility]    Start Date for Next Level of Care Medication (Required): N/A  Provider  Location   Psychiatry cannot see patients who do not have active medical insurance    What Would Be Helpful from the Transition Clinic: Pt has a referral for therapy. Please help bridge the wait until they can get in. Thanks.     Needs: NO    Does Patient Have Access to Technology: yes    Patient E-mail Address: [email or web address]    Current Patient Phone Number: [phone];     Clinician Gender Preference (if applicable): NO    Arnulfo Smith

## 2022-06-20 NOTE — PROGRESS NOTES
"Gill Rich is a 22 year old female who is being evaluated via a billable video visit.      The patient has been notified of following:     \"This video visit will be conducted via a call between you and your physician/provider. We have found that certain health care needs can be provided without the need for an in-person physical exam.  This service lets us provide the care you need with a video conversation.  If a prescription is necessary we can send it directly to your pharmacy.  If lab work is needed we can place an order for that and you can then stop by our lab to have the test done at a later time.    Video visits are billed at different rates depending on your insurance coverage.  Please reach out to your insurance provider with any questions.    If during the course of the call the physician/provider feels a video visit is not appropriate, you will not be charged for this service.\"    Patient has given verbal consent for Video visit? Yes  How would you like to obtain your AVS? MyChart  Will anyone else be joining your video visit? No  {If patient encounters technical issues they should call [phone]      Video-Visit Details    Type of service:  Video Visit    Video Start Time: 10:30 AM  Video End Time: 11:00 Am    Originating Location (pt. Location): Home    Distant Location (provider location):  [facility] [place]     Platform used for Video Visit: SportsMan[employer]    During this virtual visit the patient is located in [place], patient verifies this as the location during the entirety of this visit.     New Weight Management Nutrition Consultation    Gill Rich is a 22 year old female presents today for new weight management nutrition consultation.  Patient referred by Dr. Mimi Michael on June 20, 2022.    Patient with Co-morbidities of obesity including:  Type II DM no  Renal Failure no  Sleep apnea no  Hypertension no   Dyslipidemia no  Joint pain no  Back pain " "no  GERD no     Referred by her cardiologist during a routine visit for Marfan's syndrome.    Anthropometrics:  Estimated body mass index is 38.16 kg/m  as calculated from the following:    Height as of an earlier encounter on 6/21/22: 1.588 m (5' 2.5\").    Weight as of an earlier encounter on 6/21/22: 96.2 kg (212 lb).     70 lbs wt gain over the past 3 years    Medications for Weight Loss:  None     NUTRITION HISTORY  Food allergies: None  Food intolerances: None  Supplements: None  Previous methods of diet modification for weight loss: 1-2 years ago, tried IF 18/6 for 1 week, did not see any wt loss.   She works in  at [employer] store) from 7-4pm, 5 days per week. Less structured with meals on days off.  Living with her parents, they cook dinner and do the grocery shopping for the most part.  She wants to learn how to improve her diet and not dine-out/take-out as much.  Sleep by 12-2 am, up by 6:30am.     Diet Recall:  Breakfast: no breakfast  Lunch: 1:30 pm salad from work or rice bowl from work;   PM snack: couple times per week - crackers, chips    Dinner: 5-6 pm burgers and salad with veggies; 2 slices meat + 1-2 scoop salad and 1-2 scoop veggie (corn, green beans)    HS snack: 10:30-11 pm snack (crackers, chips) or take-out (Mcdonalds, Taco Bell)  Drinks: Regular soda x1/day, no diet soda Propel more often now, Agua fresca (agave). Water occ.   Alcohol: 1-2 drinks x2/weekly  Dining out/take-out: a couple nights per week or less    Physical Activity:  Not discussed today    Nutrition Prescription  Recommended energy/nutrient modification.    Nutrition Diagnosis  Food and nutrition related knowledge deficit r/t lack of prior exposure to nutrition education r/t weight management aeb pt presenting with questions regarding nutrition for wt loss and interest in nutrition education.    Nutrition Intervention  Materials/education provided on hypocaloric diet for weight loss. Discussed " Volumetric eating to help satiety level on fewer calories; portion control and healthy food choices (Plate Method and Volumetrics handouts), meal and snack planning and mindful eating habits.   Discussed hydration needs, and sugar-free beverage alternatives.  Provided education on heart healthy diet including lean protein sources, sources of saturated fat to limit, high-fiber foods to eat more of, and low sodium diet tips.    Discussed using nutrition info of menu at work to help guide food choice.  Discussed importance of proper sleep hygiene for weight mgmt.   Co-developed goals to work towards this month.     Patient demonstrates understanding.    Expected Engagement: good  Follow-Up Plans: meal planning, eating pattern, activity     Nutrition Goals  1) Increase hydration, aim for 2-3L per day.   - Use sugar-free/calorie-free fluids for hydration, e.g. Water, Crystal Lite, [place], Propel, Gatorade zero  2) Plan evening snack ahead of time   - Example: 1/4 cup nuts + 1 slice/1 cheese stick + 1 cup strawberries + 1/2 cup pineapple, 1/2 cup   3) Limit dining out to 1-2 times per week.     The Plate Method  Http://[email or web address]    https://cdn1.sph.[employer].edu/wp-content/uploads/sites/30/2012/09/FCOXam0078.jpg    https://www.cdc.gov/diabetes/managing/eat-well/meal-plan-method.html#plate    Protein Sources   [email or web address]     Carbohydrates  [email or web address]     Mindful Eating  [email or web address]     Tips for a Low Sodium Diet  http://www.[email or web address]       Follow-Up:  1 month, PRN    Time spent with patient: 30 minutes.  Kylah Rincon RD, LD

## 2022-06-21 ENCOUNTER — VIRTUAL VISIT (OUTPATIENT)
Dept: ENDOCRINOLOGY | Facility: CLINIC | Age: 22
End: 2022-06-21
Payer: COMMERCIAL

## 2022-06-21 ENCOUNTER — VIRTUAL VISIT (OUTPATIENT)
Dept: ENDOCRINOLOGY | Facility: CLINIC | Age: 22
End: 2022-06-21

## 2022-06-21 VITALS — BODY MASS INDEX: 37.56 KG/M2 | HEIGHT: 63 IN | WEIGHT: 212 LBS

## 2022-06-21 DIAGNOSIS — E66.9 OBESITY: ICD-10-CM

## 2022-06-21 DIAGNOSIS — Z71.3 NUTRITIONAL COUNSELING: Primary | ICD-10-CM

## 2022-06-21 DIAGNOSIS — E66.812 CLASS 2 OBESITY WITHOUT SERIOUS COMORBIDITY WITH BODY MASS INDEX (BMI) OF 37.0 TO 37.9 IN ADULT, UNSPECIFIED OBESITY TYPE: ICD-10-CM

## 2022-06-21 PROCEDURE — 97802 MEDICAL NUTRITION INDIV IN: CPT | Performed by: DIETITIAN, REGISTERED

## 2022-06-21 PROCEDURE — 99204 OFFICE O/P NEW MOD 45 MIN: CPT | Mod: 95 | Performed by: INTERNAL MEDICINE

## 2022-06-21 NOTE — PROGRESS NOTES
"  New Medical Weight Management Consult    PATIENT:  Gill Rich  MRN:         [id number]  :         2000  DOMINGO:         2022    Dear Dr. Hicks and colleagues,     I had the pleasure of seeing your patient, Gill Rich. Full intake/assessment was done to determine barriers to weight loss success and develop a treatment plan. Gill Rich is a 22 year old female interested in treatment of medical problems associated with excess weight. She has a height of 5' 2.5\", a weight of 212 lbs 0 oz, and the calculated Body mass index is 38.16 kg/m .     She has the following co-morbidities: Marfan's syndrome, ADHD, depression    Was referred by her cardiologist during a routine visit for Marfan's syndrome. Current weight 215 lbs. The patient notes she has gained about 70 lbs since she weighed about 145lbs when she was 19 years old. She attributes the weight gain to less physical activity after stopping high school sports, as well as her depression. States that food has been a source of comfort in the past. She notes her mental health has improved and is now stable since starting Prozac a few months ago, managed by her cardiologist. No history of using other antidepressants. Had been on Ritalin in the past for ADHD but self-discontinued the medication. She does not feel that weight gain was tied to stopping Ritalin.    She works in  at [employer] from 7-4pm for 5 days/week.    Diet recalled:  Breakfast: skipped  Lunch: salad from work around 1PM  Snacks: chips/crackers after work  Dinner: usually takeout ([employer]/Taco Bell) around 11pm-12am  Drinks: regular soda x1/day, no diet soda  Alcohol: 1-2 drinks x2/weekly    She states she does struggle with cravings.  No physical activity besides standing/walking at work.   She sleeps 4-5 hours/night.  No history of T2DM or thyroid disease. TFT's were normal 2022.  No antipsychotic or steroid use.         2022   I have the " "following health issues associated with obesity: None of the above   I have the following symptoms associated with obesity: None of the above       Patient Goals 6/21/2022   I am interested in having a healthier weight to diminish current health problems: Yes   I am interested in having a healthier weight in order to prevent future health problems: Yes   I am interested in having a healthier weight in order to have a future surgery: No       Referring Provider 6/21/2022   Please name the provider who referred you to Medical Weight Management.  If you do not know, please answer: \"I Don't Know\". Dr. Hicks       Weight History 6/21/2022   How concerned are you about your weight? Very Concerned   Would you describe your weight gain as gradual? No   I became overweight: In College   The following factors have contributed to my weight gain:  Mental Health Issues, Change in Schedule, Eating Wrong Types of Food, Eating Too Much, Lack of Exercise, Stress   My lowest weight since age 19 was: 145   My highest weight since age 19 was: 212   I have the following family history of obesity/being overweight:  I am the only one in my immediate family who is overweight   Has anyone in your family had weight loss surgery? No   How has your weight changed over the last year?  Gained   How many pounds? 50       Diet Recall Review with Patient 6/21/2022   Do you typically eat breakfast? No   Do you typically eat lunch? Yes   If you do eat lunch, what types of food do you typically eat?  Salad or rice bowl   Do you typically eat supper? Yes   If you do eat supper, what types of food do you typically eat? Meat, veggies, and bread   Do you typically eat snacks? Yes   If you do snack, what types of food do you typically eat? Chips, sweets, granola bars   Do you like vegetables?  Yes   Do you drink water? No   How many glasses of juice do you drink in a typical day? 1   How many of glasses of milk do you drink in a typical day? 0   If you do " drink milk, what type? N/A   How many 8oz glasses of sugar containing drinks such as Brennan-Aid/sweet tea do you drink in a day? 0   How many cans/bottles of sugar pop/soda/tea/sports drinks do you drink in a day? 2   How many cans/bottles of diet pop/soda/tea or sports drink do you drink in a day? 0   How often do you have a drink of alcohol? 2-3 TImes a Week   If you do drink, how many drinks might you have in a day? 1 or 2       Eating Habits 6/21/2022   Generally, my meals include foods like these: bread, pasta, rice, potatoes, corn, crackers, sweet dessert, pop, or juice. Everyday   Generally, my meals include foods like these: fried meats, brats, burgers, french fries, pizza, cheese, chips, or ice cream. A Few Times a Week   Eat fast food (like [employer], [employer]OPTICA, Taco Bell). A Few Times a Week   Eat at a buffet or sit-down restaurant. Less Than Weekly   Eat most of my meals in front of the TV or computer. A Few Times a Week   Often skip meals, eat at random times, have no regular eating times. Everyday   Rarely sit down for a meal but snack or graze throughout.  Almost Everyday   Eat extra snacks between meals. Everyday   Eat most of my food at the end of the day. Everyday   Eat in the middle of the night or wake up at night to eat. Never   Eat extra snacks to prevent or correct low blood sugar. Never   Eat to prevent acid reflux or stomach pain. Never   Worry about not having enough food to eat. Never   Have you been to the food shelf at least a few times this year? No   I eat when I am depressed. A Few Times a Week   I eat when I am stressed. A Few Times a Week   I eat when I am bored. Almost Everyday   I eat when I am anxious. A Few Times a Week   I eat when I am happy or as a reward. A Few Times a Week   I feel hungry all the time even if I just have eaten. A Few Times a Week   Feeling full is important to me. A Few Times a Week   I finish all the food on my plate even if I am already full. Once a Week    I can't resist eating delicious food or walk past the good food/smell. Less Than Weekly   I eat/snack without noticing that I am eating. Never   I eat when I am preparing the meal. Never   I eat more than usual when I see others eating. A Few Times a Week   I have trouble not eating sweets, ice cream, cookies, or chips if they are around the house. Everyday   I think about food all day. A Few Times a Week   What foods, if any, do you crave? None       Amount of Food 6/21/2022   I make myself vomit what I have eaten or use laxatives to get rid of food. Never   I eat a large amount of food, like a loaf of bread, a box of cookies, a pint/quart of ice cream, all at once. Weekly   I eat a large amount of food even when I am not hungry. Weekly   I eat rapidly. Everyday   I eat alone because I feel embarrassed and do not want others to see how much I have eaten. Never   I eat until I am uncomfortably full. Never   I feel bad, disgusted, or guilty after I overeat. Never   I make myself vomit what I have eaten or use laxatives to get rid of food. Never       Activity/Exercise History 6/21/2022   How much of a typical 12 hour day do you spend sitting? Less Than Half the Day   How much of a typical 12 hour day do you spend lying down? Less Than Half the Day   How much of a typical day do you spend walking/standing? Most of the Day   How many hours (not including work) do you spend on the TV/Video Games/Computer/Tablet/Phone? 2-3 Hours   How many times a week are you active for the purpose of exercise? Never   What keeps you from being more active? Unsure What To Do   How many total minutes do you spend doing some activity for the purpose of exercising when you exercise? Less Than 15 Minutes       PAST MEDICAL HISTORY:  No past medical history on file.    Work/Social History Reviewed With Patient 6/21/2022   My employment status is: Full-Time   My job is: [employer]   How much of your job is spent on the computer or phone?  "Less Than 50%   How many hours do you spend commuting to work daily?  1/2   What is your marital status? /In a Relationship   If in a relationship, is your significant other overweight? Yes   Do you have children? No   If you have children, are they overweight? N/A   Who do you live with?  My parents   Are they supportive of your health goals? Yes   Who does the food shopping?  Mother       Mental Health History Reviewed With Patient 6/21/2022   Have you ever been physically or sexually abused? No   If yes, do you feel that the abuse is affecting your weight? N/A   If yes, would you like to talk to a counselor about the abuse? N/A   How often in the past 2 weeks have you felt little interest or pleasure in doing things? Not at all   Over the past 2 weeks how often have you felt down, depressed, or hopeless? Not at all       Sleep History Reviewed With Patient 6/21/2022   How many hours do you sleep at night? 6   Do you think that you snore loudly or has anybody ever heard you snore loudly (louder than talking or so loud it can be heard behind a shut door)? No   Has anyone seen or heard you stop breathing during your sleep? No   Do you often feel tired, fatigued, or sleepy during the day? Yes   Do you have a TV/Computer in your bedroom? Yes       MEDICATIONS:   Current Outpatient Medications   Medication Sig Dispense Refill     FLUoxetine (PROZAC) 20 MG capsule Take 1 capsule (20 mg) by mouth daily 90 capsule 3     levonorgestrel (MIRENA, 52 MG,) 20 MCG/24HR IUD 52 mg       losartan (COZAAR) 25 MG tablet Take 1 tablet (25 mg) by mouth daily 90 tablet 3       ALLERGIES:   Allergies   Allergen Reactions     Methylphenidate Derivatives Headache and Nausea       PHYSICAL EXAM:  Ht 1.588 m (5' 2.5\")   Wt 96.2 kg (212 lb)   BMI 38.16 kg/m      Waist circumference:      Wt Readings from Last 4 Encounters:   06/21/22 96.2 kg (212 lb)   05/20/22 96.4 kg (212 lb 8 oz)   03/18/22 94.1 kg (207 lb 6.4 oz)   10/05/21 93.9 " kg (207 lb 1.6 oz)     A & O x 3  HEENT: NCAT, mucous membranes moist  No Cushingoid facies  Respirations unlabored  Location of obesity: Mixed Obesity    ASSESSMENT/PLAN:  Gill is a patient with early onset obesity with BMI of 38 currently, +70 lb weight gain in 3-4 years. Likely contributors include depression which was relatively uncontrolled until the past few months, lack of physical activity, suboptimal dietary habits.     No history or signs of endocrine disorders (DM, thyroid disease, Cushing's).  Depression is now well-controlled on Prozac, per pt. Patient would like to focus on diet and lifestyle modifications. We discussed some diet/lifestyle goals today. Pt agreeable to meet with dietician today. Discussed option of adding medical management to aid weight loss in the future if desired, alongside continued lifestyle modifications.     PLAN:    Diet/Lifestyle modification goals:  -Decrease soda from once daily to a few times/week  -Reduce carbohydrate-heavy snacks  -Begin walks a few times/week  -Dietician visit today  -Can discuss medical management in the future if desired    FOLLOW-UP:    -2 to 3 months    History and documentation by Nay Garcia MS3 under direct supervision.    VDO start 0915 am  VDO end 0940 am  VDO duration 25 minutes    I was present with medical student who participated in the service and in the documentation of the note. I have verified the history and personally performed the physical exam and medical decision making. I agree with the assessment and plan of care as documented in the note. Medical student acts as a scribe.    External notes/medical records independently reviewed, labs and imaging independently reviewed, medical management and tests to be discussed/communicated to patient.    Time: I spent 56 minutes spent on the date of the encounter preparing to see patient (including chart review and preparation), obtaining and or reviewing additional medical history,  performing a physical exam and evaluation, documenting clinical information in the electronic health record, independently interpreting results, communicating results to the patient and coordinating care.    Mimi Michael MD  Division of Diabetes and Endocrinology  Department of Medicine  [phone]

## 2022-06-21 NOTE — PROGRESS NOTES
Virtual Visit Check-In    During this virtual visit the patient is located in [place], patient verifies this as the location during the entirety of this visit.     Gill is a 22 year old who is being evaluated via a billable video visit.      How would you like to obtain your AVS? MyChart  If the video visit is dropped, the invitation should be resent by: Text to cell phone:  [phone]  Will anyone else be joining your video visit? No        Video-Visit Details    Type of service:  Video Visit    Originating Location (pt. Location): Home    Distant Location (provider location):  [facility] [place]     Platform used for Video Visit: Mark Pool NREMT

## 2022-06-21 NOTE — LETTER
"6/21/2022       RE: Gill Rich  [address]  [facility] [address]     Dear Colleague,    Thank you for referring your patient, Gill Rich, to the [facility] [place] at [facility]. Please see a copy of my visit note below.    Gill Rich is a 22 year old female who is being evaluated via a billable video visit.      The patient has been notified of following:     \"This video visit will be conducted via a call between you and your physician/provider. We have found that certain health care needs can be provided without the need for an in-person physical exam.  This service lets us provide the care you need with a video conversation.  If a prescription is necessary we can send it directly to your pharmacy.  If lab work is needed we can place an order for that and you can then stop by our lab to have the test done at a later time.    Video visits are billed at different rates depending on your insurance coverage.  Please reach out to your insurance provider with any questions.    If during the course of the call the physician/provider feels a video visit is not appropriate, you will not be charged for this service.\"    Patient has given verbal consent for Video visit? Yes  How would you like to obtain your AVS? MyChart  Will anyone else be joining your video visit? No  {If patient encounters technical issues they should call [phone]      Video-Visit Details    Type of service:  Video Visit    Video Start Time: 10:30 AM  Video End Time: 11:00 Am    Originating Location (pt. Location): Home    Distant Location (provider location):  [facility] [place]     Platform used for Video Visit: [place] [employer] [place]    During this virtual visit the patient is located in [place], patient verifies this as the location during the entirety of this visit.     New Weight Management Nutrition Consultation    Gill Rich is a " "22 year old female presents today for new weight management nutrition consultation.  Patient referred by Dr. Mimi Michael on June 20, 2022.    Patient with Co-morbidities of obesity including:  Type II DM no  Renal Failure no  Sleep apnea no  Hypertension no   Dyslipidemia no  Joint pain no  Back pain no  GERD no     Referred by her cardiologist during a routine visit for Marfan's syndrome.    Anthropometrics:  Estimated body mass index is 38.16 kg/m  as calculated from the following:    Height as of an earlier encounter on 6/21/22: 1.588 m (5' 2.5\").    Weight as of an earlier encounter on 6/21/22: 96.2 kg (212 lb).     70 lbs wt gain over the past 3 years    Medications for Weight Loss:  None     NUTRITION HISTORY  Food allergies: None  Food intolerances: None  Supplements: None  Previous methods of diet modification for weight loss: 1-2 years ago, tried IF 18/6 for 1 week, did not see any wt loss.   She works in  at [employer]) from 7-4pm, 5 days per week. Less structured with meals on days off.  Living with her parents, they cook dinner and do the grocery shopping for the most part.  She wants to learn how to improve her diet and not dine-out/take-out as much.  Sleep by 12-2 am, up by 6:30am.     Diet Recall:  Breakfast: no breakfast  Lunch: 1:30 pm salad from work or rice bowl from work;   PM snack: couple times per week - crackers, chips    Dinner: 5-6 pm burgers and salad with veggies; 2 slices meat + 1-2 scoop salad and 1-2 scoop veggie (corn, green beans)    HS snack: 10:30-11 pm snack (crackers, chips) or take-out (Mcdonalds, Taco Bell)  Drinks: Regular soda x1/day, no diet soda Propel more often now, Agua fresca (agave). Water occ.   Alcohol: 1-2 drinks x2/weekly  Dining out/take-out: a couple nights per week or less    Physical Activity:  Not discussed today    Nutrition Prescription  Recommended energy/nutrient modification.    Nutrition Diagnosis  Food and " nutrition related knowledge deficit r/t lack of prior exposure to nutrition education r/t weight management aeb pt presenting with questions regarding nutrition for wt loss and interest in nutrition education.    Nutrition Intervention  Materials/education provided on hypocaloric diet for weight loss. Discussed Volumetric eating to help satiety level on fewer calories; portion control and healthy food choices (Plate Method and Volumetrics handouts), meal and snack planning and mindful eating habits.   Discussed hydration needs, and sugar-free beverage alternatives.  Provided education on heart healthy diet including lean protein sources, sources of saturated fat to limit, high-fiber foods to eat more of, and low sodium diet tips.    Discussed using nutrition info of menu at work to help guide food choice.  Discussed importance of proper sleep hygiene for weight mgmt.   Co-developed goals to work towards this month.     Patient demonstrates understanding.    Expected Engagement: good  Follow-Up Plans: meal planning, eating pattern, activity     Nutrition Goals  1) Increase hydration, aim for 2-3L per day.   - Use sugar-free/calorie-free fluids for hydration, e.g. Water, Crystal Lite, Burt, Propel, Gatorade zero  2) Plan evening snack ahead of time   - Example: 1/4 cup nuts + 1 slice/1 cheese stick + 1 cup strawberries + 1/2 cup pineapple, 1/2 cup   3) Limit dining out to 1-2 times per week.     The Plate Method  Http://www.Science/095626.pdf    https://cdn1.sph.[place].edu/wp-content/uploads/sites/30/2012/09/HUMYty2049.jpg    https://www.cdc.gov/diabetes/managing/eat-well/meal-plan-method.html#plate    Protein Sources   http://Science/206650.pdf     Carbohydrates  http://fvfiles.com/395845.pdf     Mindful Eating  http://Science/717928.pdf     Tips for a Low Sodium Diet  http://www.fvfiles.com/254652.pdf       Follow-Up:  1 month, PRN    Time spent with patient: 30 minutes.  Kylah Rincon RD, LD

## 2022-06-21 NOTE — PATIENT INSTRUCTIONS
Johnathan Garland,    Follow-up with RD in 1 month.    Thank you,    Kylah Rincon, RD, LD  If you would like to schedule or reschedule an appointment with the RD, please call [phone]    Nutrition Goals  1) Increase hydration, aim for 2-3L per day.   - Use sugar-free/calorie-free fluids for hydration, e.g. Water, Crystal Lite, [place], Propel, Gatorade zero  2) Plan evening snack ahead of time   - Example: 1/4 cup nuts + 1 slice/1 cheese stick + 1 cup strawberries + 1/2 cup pineapple, 1/2 cup   3) Limit dining out to 1-2 times per week.     The Plate Method  Http://www.ClearCare/336409.pdf    https://cdMaverick Wine Group LLC..sph.[place].edu/wp-content/uploads/sites/30/2012/09/VZSHoa2475.jpg    https://www.cdc.gov/diabetes/managing/eat-well/meal-plan-method.html#plate    Protein Sources   http://ClearCare/654555.pdf     Carbohydrates  http://fvfiles.com/458083.pdf     Mindful Eating  http://ClearCare/540151.pdf     Tips for a Low Sodium Diet  http://www.fvfiles.com/150194.pdf     Interested in working with a health ?  Health coaches work with you to improve your overall health and wellbeing.  They look at the whole person, and may involve discussion of different areas of life, including, but not limited to the four pillars of health (sleep, exercise, nutrition, and stress management). Discuss with your care team if you would like to start working a health .    Health Coaching-3 Pack:    $99 for three health coaching visits    Visits may be done in person or via phone    Coaching is a partnership between the  and the client; Coaches do not prescribe or diagnose    Coaching helps inspire the client to reach his/her personal goals      COMPREHENSIVE WEIGHT MANAGEMENT PROGRAM  VIRTUAL SUPPORT GROUPS    For Support Group Information:      We offer support groups for patients who are working on weight loss and considering, preparing for or have had weight loss surgery.   There is no cost for this opportunity.  You are invited to  "attend the?Virtual Support Groups?provided by any of the following locations:    [facility] via Ygline.com Teams with Mary Betts RN  2.   [place] via WebSideStory with Tj Martines, PhD, LP  3.   [place] via Ygline.com Teams with Kerline Chavira RN  4.   [facility] via Ygline.com Teams with Kerline Andujar [facility]    The following Support Group information can also be found on our website:  [email or web address]      [facility] Weight Loss Surgery Support Group    [facility] Weight Loss Surgery Support Group  The support group is a patient-lead forum that meets monthly to share experiences, encouragement and education. It is open to those who have had weight loss surgery, are scheduled for surgery, and those who are considering surgery.   WHEN: This group meets on the 3rd Wednesday of each month from 5:00PM - 6:00PM virtually using Microsoft Teams.   FACILITATOR: Led by Mary Betts RD, ACE, RN, the program's Clinical Coordinator.   TO REGISTER: Please contact the clinic via [employer] or call the nurse line directly at [phone] to inform our staff that you would like an invite sent to you and to let us know the email you would like the invite sent to. Prior to the meeting, a link with directions on how to join the meeting will be sent to you.    2022 Meetings  January 19: \"Let's Talk\" a time for the group to share.  February 16: \"Let's Talk\" a time for the group to share.  March 16: Guest Speakers: Psychologists, Amparo Menchaca, PhD,LP and Yumiko Camilo, PsyD,LP  April 20: Guest Speaker: Health , Karen Harrison, CHC,CHES, CPT  May 18: Guest Speaker: DietitianBird, DULCE, LP  Leyla 15: \"Let's Talk\" a time for the group to share.  July 20: \"Let's Talk\" a time for the group to share.  August 17: TBA  September 21: TBA  October 19: Guest Speaker: Dr Audie Royal MD Pulmonologist and Sleep Medicine Physician, \"Getting a " "Good Night's Sleep\".  November 16: TBA  December 21: TBA    [facility] Support Groups    Connections: Bariatric Care Support Group?  This is open to all [facility] (and those external to this program) pre- and post- operative bariatric surgery patients as well as their support system.   WHEN: This group meets the 2nd Tuesday of each month from 6:30 PM - 8:00 PM virtually using Microsoft Teams.   FACILITATOR: Led by Tj Martines, Ph.D who is a Licensed Psychologist with the [facility] Comprehensive Weight Management Program.   TO REGISTER: Please send an email to Tj Martines, Ph.D., LP at?[email or web address]?if you would like an invitation to the group and to learn about using Microsoft Teams.    2022 Meetings  January 11: Asuncion Bautista, PharmD, Pharmacy Resident at [facility], \"Medications and Bariatric Surgery\".  February 8: Open Forum  March 8  April 12  May 10  Leyla 14    Connections: Post-Operative Bariatric Surgery Support Group  This is a support group for [facility] bariatric patients (and those external to [facility]) who have had bariatric surgery and are at least 3 months post-surgery.  WHEN: This support group meets the 4th Wednesday of the month from 11:00 AM - 12:00 PM virtually using Microsoft Teams.   FACILITATOR: Led by Certified Bariatric Nurse, Kerline Chavira RN.   TO REGISTER: Please send an email to Kerline at [email or web address] if you would like an invitation to the group and to learn about using Microsoft Teams.    2022 Meetings  January 26  February 23  March 23  April 27  May 25  Leyla 22    [facility] Healthy Lifestyle Virtual Support Group    Healthy Lifestyle Virtual Support Group?  This is 60 minutes of small group guided discussion, support and resources. All are welcome who want a healthy lifestyle.  WHEN: This group meets monthly on a Friday from 12:30 " PM - 1:30 PM virtually using Microsoft Teams.   FACILITATOR: Led by National Board Certified Health and , Kerline Andujar, [facility].   TO REGISTER: Please send an email to Kerline [email or web address] to receive monthly invites to the group or if you have any questions about having a health .  Prior to the meeting, a link with directions on how to join the meeting will be sent to you.    2022 Meetings  MAY 20: OPEN FORUM  JUNE: 24:  Setting Limits and BoundariesKerline  July 29: OPEN FORUM  August 26: Special Guest Registered Dietician Genia Stratton  Sept 30: OPEN FORUM  Oct 28, GraJohana Andrade National Board Certified Health   Nov 18: Navigating How to Eat Around the Holidays with DULCE Davila  Dec 16: Changing your relationship with movement with  Karen National Board Certified Health

## 2022-06-21 NOTE — NURSING NOTE
Chief Complaint   Patient presents with     Consult     New consultation for weight management.         Vitals:    06/21/22 0844   Weight: 212 lb       Body mass index is 38.16 kg/m .      Bean Pool, EMT  Surgery Clinic

## 2022-06-21 NOTE — LETTER
"2022       RE: Gill Rich  [address]  [facility] [address]     Dear Colleague,    Thank you for referring your patient, Gill Rich, to the [facility] [place] at [facility]. Please see a copy of my visit note below.        New Medical Weight Management Consult    PATIENT:  Gill Rich  MRN:         [id number]  :         2000  DOMINGO:         2022    Dear Dr. Hicks and colleagues,     I had the pleasure of seeing your patient, Gill Rich. Full intake/assessment was done to determine barriers to weight loss success and develop a treatment plan. Gill Rich is a 22 year old female interested in treatment of medical problems associated with excess weight. She has a height of 5' 2.5\", a weight of 212 lbs 0 oz, and the calculated Body mass index is 38.16 kg/m .     She has the following co-morbidities: Marfan's syndrome, ADHD, depression    Was referred by her cardiologist during a routine visit for Marfan's syndrome. Current weight 215 lbs. The patient notes she has gained about 70 lbs since she weighed about 145lbs when she was 19 years old. She attributes the weight gain to less physical activity after stopping high school sports, as well as her depression. States that food has been a source of comfort in the past. She notes her mental health has improved and is now stable since starting Prozac a few months ago, managed by her cardiologist. No history of using other antidepressants. Had been on Ritalin in the past for ADHD but self-discontinued the medication. She does not feel that weight gain was tied to stopping Ritalin.    She works in  at [employer]) from 7-4pm for 5 days/week.    Diet recalled:  Breakfast: skipped  Lunch: salad from work around 1PM  Snacks: chips/crackers after work  Dinner: usually takeout ([employer]) around 11pm-12am  Drinks: regular soda " "x1/day, no diet soda  Alcohol: 1-2 drinks x2/weekly    She states she does struggle with cravings.  No physical activity besides standing/walking at work.   She sleeps 4-5 hours/night.  No history of T2DM or thyroid disease. TFT's were normal 2/2022.  No antipsychotic or steroid use.         6/21/2022   I have the following health issues associated with obesity: None of the above   I have the following symptoms associated with obesity: None of the above       Patient Goals 6/21/2022   I am interested in having a healthier weight to diminish current health problems: Yes   I am interested in having a healthier weight in order to prevent future health problems: Yes   I am interested in having a healthier weight in order to have a future surgery: No       Referring Provider 6/21/2022   Please name the provider who referred you to Medical Weight Management.  If you do not know, please answer: \"I Don't Know\". Dr. Hicks       Weight History 6/21/2022   How concerned are you about your weight? Very Concerned   Would you describe your weight gain as gradual? No   I became overweight: In College   The following factors have contributed to my weight gain:  Mental Health Issues, Change in Schedule, Eating Wrong Types of Food, Eating Too Much, Lack of Exercise, Stress   My lowest weight since age 19 was: 145   My highest weight since age 19 was: 212   I have the following family history of obesity/being overweight:  I am the only one in my immediate family who is overweight   Has anyone in your family had weight loss surgery? No   How has your weight changed over the last year?  Gained   How many pounds? 50       Diet Recall Review with Patient 6/21/2022   Do you typically eat breakfast? No   Do you typically eat lunch? Yes   If you do eat lunch, what types of food do you typically eat?  Salad or rice bowl   Do you typically eat supper? Yes   If you do eat supper, what types of food do you typically eat? Meat, veggies, and bread "   Do you typically eat snacks? Yes   If you do snack, what types of food do you typically eat? Chips, sweets, granola bars   Do you like vegetables?  Yes   Do you drink water? No   How many glasses of juice do you drink in a typical day? 1   How many of glasses of milk do you drink in a typical day? 0   If you do drink milk, what type? N/A   How many 8oz glasses of sugar containing drinks such as Brennan-A[employer]/sweet tea do you drink in a day? 0   How many cans/bottles of sugar pop/soda/tea/sports drinks do you drink in a day? 2   How many cans/bottles of diet pop/soda/tea or sports drink do you drink in a day? 0   How often do you have a drink of alcohol? 2-3 TImes a Week   If you do drink, how many drinks might you have in a day? 1 or 2       Eating Habits 6/21/2022   Generally, my meals include foods like these: bread, pasta, rice, potatoes, corn, crackers, sweet dessert, pop, or juice. Everyday   Generally, my meals include foods like these: fried meats, brats, burgers, french fries, pizza, cheese, chips, or ice cream. A Few Times a Week   Eat fast food (like [employer], [employer], [employer]aco [employer]). A Few Times a Week   Eat at a buffet or sit-down restaurant. Less Than Weekly   Eat most of my meals in front of the TV or computer. A Few Times a Week   Often skip meals, eat at random times, have no regular eating times. Everyday   Rarely sit down for a meal but snack or graze throughout.  Almost Everyday   Eat extra snacks between meals. Everyday   Eat most of my food at the end of the day. Everyday   Eat in the middle of the night or wake up at night to eat. Never   Eat extra snacks to prevent or correct low blood sugar. Never   Eat to prevent acid reflux or stomach pain. Never   Worry about not having enough food to eat. Never   Have you been to the food shelf at least a few times this year? No   I eat when I am depressed. A Few Times a Week   I eat when I am stressed. A Few Times a Week   I eat when I am bored. Almost  Everyday   I eat when I am anxious. A Few Times a Week   I eat when I am happy or as a reward. A Few Times a Week   I feel hungry all the time even if I just have eaten. A Few Times a Week   Feeling full is important to me. A Few Times a Week   I finish all the food on my plate even if I am already full. Once a Week   I can't resist eating delicious food or walk past the good food/smell. Less Than Weekly   I eat/snack without noticing that I am eating. Never   I eat when I am preparing the meal. Never   I eat more than usual when I see others eating. A Few Times a Week   I have trouble not eating sweets, ice cream, cookies, or chips if they are around the house. Everyday   I think about food all day. A Few Times a Week   What foods, if any, do you crave? None       Amount of Food 6/21/2022   I make myself vomit what I have eaten or use laxatives to get rid of food. Never   I eat a large amount of food, like a loaf of bread, a box of cookies, a pint/quart of ice cream, all at once. Weekly   I eat a large amount of food even when I am not hungry. Weekly   I eat rapidly. Everyday   I eat alone because I feel embarrassed and do not want others to see how much I have eaten. Never   I eat until I am uncomfortably full. Never   I feel bad, disgusted, or guilty after I overeat. Never   I make myself vomit what I have eaten or use laxatives to get rid of food. Never       Activity/Exercise History 6/21/2022   How much of a typical 12 hour day do you spend sitting? Less Than Half the Day   How much of a typical 12 hour day do you spend lying down? Less Than Half the Day   How much of a typical day do you spend walking/standing? Most of the Day   How many hours (not including work) do you spend on the TV/Video Games/Computer/Tablet/Phone? 2-3 Hours   How many times a week are you active for the purpose of exercise? Never   What keeps you from being more active? Unsure What To Do   How many total minutes do you spend doing some  activity for the purpose of exercising when you exercise? Less Than 15 Minutes       PAST MEDICAL HISTORY:  No past medical history on file.    Work/Social History Reviewed With Patient 6/21/2022   My employment status is: Full-Time   My job is: [employer]   How much of your job is spent on the computer or phone? Less Than 50%   How many hours do you spend commuting to work daily?  1/2   What is your marital status? /In a Relationship   If in a relationship, is your significant other overweight? Yes   Do you have children? No   If you have children, are they overweight? N/A   Who do you live with?  My parents   Are they supportive of your health goals? Yes   Who does the food shopping?  Mother       Mental Health History Reviewed With Patient 6/21/2022   Have you ever been physically or sexually abused? No   If yes, do you feel that the abuse is affecting your weight? N/A   If yes, would you like to talk to a counselor about the abuse? N/A   How often in the past 2 weeks have you felt little interest or pleasure in doing things? Not at all   Over the past 2 weeks how often have you felt down, depressed, or hopeless? Not at all       Sleep History Reviewed With Patient 6/21/2022   How many hours do you sleep at night? 6   Do you think that you snore loudly or has anybody ever heard you snore loudly (louder than talking or so loud it can be heard behind a shut door)? No   Has anyone seen or heard you stop breathing during your sleep? No   Do you often feel tired, fatigued, or sleepy during the day? Yes   Do you have a TV/Computer in your bedroom? Yes       MEDICATIONS:   Current Outpatient Medications   Medication Sig Dispense Refill     FLUoxetine (PROZAC) 20 MG capsule Take 1 capsule (20 mg) by mouth daily 90 capsule 3     levonorgestrel (MIRENA, 52 MG,) 20 MCG/24HR IUD 52 mg       losartan (COZAAR) 25 MG tablet Take 1 tablet (25 mg) by mouth daily 90 tablet 3       ALLERGIES:   Allergies   Allergen  "Reactions     Methylphenidate Derivatives Headache and Nausea       PHYSICAL EXAM:  Ht 1.588 m (5' 2.5\")   Wt 96.2 kg (212 lb)   BMI 38.16 kg/m      Waist circumference:      Wt Readings from Last 4 Encounters:   06/21/22 96.2 kg (212 lb)   05/20/22 96.4 kg (212 lb 8 oz)   03/18/22 94.1 kg (207 lb 6.4 oz)   10/05/21 93.9 kg (207 lb 1.6 oz)     A & O x 3  HEENT: NCAT, mucous membranes moist  No Cushingoid facies  Respirations unlabored  Location of obesity: Mixed Obesity    ASSESSMENT/PLAN:  Gill is a patient with early onset obesity with BMI of 38 currently, +70 lb weight gain in 3-4 years. Likely contributors include depression which was relatively uncontrolled until the past few months, lack of physical activity, suboptimal dietary habits.     No history or signs of endocrine disorders (DM, thyroid disease, Cushing's).  Depression is now well-controlled on Prozac, per pt. Patient would like to focus on diet and lifestyle modifications. We discussed some diet/lifestyle goals today. Pt agreeable to meet with dietician today. Discussed option of adding medical management to aid weight loss in the future if desired, alongside continued lifestyle modifications.     PLAN:    Diet/Lifestyle modification goals:  -Decrease soda from once daily to a few times/week  -Reduce carbohydrate-heavy snacks  -Begin walks a few times/week  -Dietician visit today  -Can discuss medical management in the future if desired    FOLLOW-UP:    -2 to 3 months    History and documentation by Nay Garcia MS3 under direct supervision.    VDO start 0915 am  VDO end 0940 am  VDO duration 25 minutes    I was present with medical student who participated in the service and in the documentation of the note. I have verified the history and personally performed the physical exam and medical decision making. I agree with the assessment and plan of care as documented in the note. Medical student acts as a scribe.    External notes/medical records " independently reviewed, labs and imaging independently reviewed, medical management and tests to be discussed/communicated to patient.    Time: I spent 56 minutes spent on the date of the encounter preparing to see patient (including chart review and preparation), obtaining and or reviewing additional medical history, performing a physical exam and evaluation, documenting clinical information in the electronic health record, independently interpreting results, communicating results to the patient and coordinating care.    Mimi Michael MD  Division of Diabetes and Endocrinology  Department of Medicine  [phone]

## 2022-06-30 ENCOUNTER — TELEPHONE (OUTPATIENT)
Dept: ENDOCRINOLOGY | Facility: CLINIC | Age: 22
End: 2022-06-30

## 2022-07-29 ENCOUNTER — TELEPHONE (OUTPATIENT)
Dept: CARDIOLOGY | Facility: CLINIC | Age: 22
End: 2022-07-29

## 2022-07-29 NOTE — LETTER
August 2, 2022      Gill Rich  [address]  [facility] [address]        Dear Gill,      Our records indicate that it is time for you to schedule your return visit with the [facility] in the CARDIOVASCULAR CONGENITAL CLINIC at the [facility] ([facility]).      To make your appointment, please call: [phone], Monday - Friday, 8 A.M. - 4:30 P.M (Central Time Zone).    Please check with your insurance company about your benefits.  Some insurance plans provide different coverage levels for services at [facility] hospital-based clinics.     We look forward to hearing from you.    Sincerely,    Lissa GRAHAM   Clinic Coordinator  [facility]  Office: [phone]  Fax: [phone]

## 2022-09-18 ENCOUNTER — HEALTH MAINTENANCE LETTER (OUTPATIENT)
Age: 22
End: 2022-09-18

## 2022-12-21 NOTE — PATIENT INSTRUCTIONS
You were seen today in the Adult Congenital and Cardiovascular Genetics Clinic at the [facility].    Cardiology Providers you saw during your visit:  RASTA Hicks MD    Diagnosis:  Marfans    Results:  RASTA Hicks MD reviewed the results of your EKG testing today in clinic.    Recommendations for you:    Referral made to internal medicine       General Cardiac Recommendations:  Continue to eat a heart healthy, low salt diet.  Continue to get 20-30 minutes of aerobic activity, 4-5 days per week.  Examples of aerobic activity include walking, running, swimming, cycling, etc.  Continue to observe good oral hygiene, with regular dental visits.      SBE prophylaxis:   Yes____  No__x__      Exercise restrictions:   Yes__X__  No____         If yes, list restrictions:  Must be allowed to rest if fatigued or SOB      Work restrictions:  Yes____  No_X___         If yes, list restrictions:    FASTING CHOLESTEROL was checked in the last 5 years YES__x_  NO___ (2020)  If no, please follow up with your primary care physician. You should have a cholesterol screening every 5 years.      Follow-up:  Follow up with Dr Hicks this spring with a chest MRA prior.     If you have questions or concerns please contact us at:    Emmanuelle Bennett RN, BSN   Kanchan Thakkar (Scheduling)  Nurse Care Coordinator     Clinic   Adult Congenital and CV Genetics   Adult Congenital and CV Genetic  [facility]   [facility]  (P) [phone]     (P) [phone]            (F) [phone]        For after hours urgent needs, call [phone] and ask to speak to the Adult Congenital Physician on call.  Mention Job Code 0401.    For emergencies call 911.    [facility]  [facility]   Clinics and Surgery Center  Mail Code [id number]  [address] [place] [address]  [id number]

## 2022-12-23 ENCOUNTER — VIRTUAL VISIT (OUTPATIENT)
Dept: CARDIOLOGY | Facility: CLINIC | Age: 22
End: 2022-12-23
Attending: INTERNAL MEDICINE
Payer: COMMERCIAL

## 2022-12-23 DIAGNOSIS — Q87.40 MARFAN'S SYNDROME: ICD-10-CM

## 2022-12-23 PROCEDURE — 99215 OFFICE O/P EST HI 40 MIN: CPT | Mod: 95 | Performed by: INTERNAL MEDICINE

## 2022-12-23 RX ORDER — DEXTROAMPHETAMINE SACCHARATE, AMPHETAMINE ASPARTATE MONOHYDRATE, DEXTROAMPHETAMINE SULFATE AND AMPHETAMINE SULFATE 5; 5; 5; 5 MG/1; MG/1; MG/1; MG/1
CAPSULE, EXTENDED RELEASE ORAL
COMMUNITY
Start: 2022-09-27 | End: 2023-05-01

## 2022-12-23 NOTE — NURSING NOTE
Chief Complaint   Patient presents with     Follow Up     No other vitals to report today     Uvaldo Harmon, VF/CMA

## 2022-12-23 NOTE — LETTER
12/23/2022      RE: Gill Rich  [address]  [facility] [address]       Dear Colleague,    Thank you for the opportunity to participate in the care of your patient, Gill Rich, at the [facility] CLINIC [place] [facility]. Please see a copy of my visit note below.    CARDIOLOGY CONSULTATION:       Ms. Rich is a delightful 22-year-old woman who is known to me.   She has a history of FBN1 mutation.  We also follow some of her siblings as well.  She had been on losartan 25 mg daily.     Gill was fairly active with sports in high school but has not been as active. She does have confirmed family members with FBN1 mutations including her sisters, Kerline and Jenae, and her brother, Cory, her father and 2 paternal uncles.  Her mom has multiple sclerosis.       MRA 1/6/2020 and the sinuses measured 2.7 and ascending 2.4 CM. We repeated an MRA 4/2022 and her sinuses are 3.0 and ascending aorta 2.7 CM.  We are planning to repeat her MRA next year.       When I saw Gill in March 2022 she was going through some life changes and depression; she had gained some weight and was moving home.  She had some untreated anxiety as well.  We started Prozac 20mg daily. She is working and [employer] now and is back on ADHD meds and has broken up with her boyfriend and she has lost 30 pounds since August!  She has a puppy that she is walking.  Her spirits are so much better.      Her dad was just diagnosed with Covid today and has no primary and gets care through our cardiology clinic. They were unable to get help through an online pharmacist evaluation and he has multiple risk factors for severe infection going into the holiday weekend.        PAST MEDICAL HISTORY:  No past medical history on file.    CURRENT MEDICATIONS:  Current Outpatient Medications   Medication Sig Dispense Refill     amphetamine-dextroamphetamine (ADDERALL XR) 20 MG 24 hr capsule        FLUoxetine  (PROZAC) 20 MG capsule Take 1 capsule (20 mg) by mouth daily 90 capsule 3     levonorgestrel (MIRENA, 52 MG,) 20 MCG/24HR IUD 52 mg       losartan (COZAAR) 25 MG tablet Take 1 tablet (25 mg) by mouth daily 90 tablet 3       PAST SURGICAL HISTORY:  No past surgical history on file.    ALLERGIES  Methylphenidate derivatives    FAMILY HX:  No family history on file.    SOCIAL HX:  Social History     Socioeconomic History     Marital status: Single     Spouse name: None     Number of children: None     Years of education: None     Highest education level: None   Tobacco Use     Smoking status: Never     Smokeless tobacco: Never       ROS:  Constitutional: No fever, chills, or sweats. No weight gain/loss.   ENT: No visual disturbance, ear ache, epistaxis, sore throat.   Allergies/Immunologic: Negative.   Respiratory: No cough, hemoptysis.   Cardiovascular: As per HPI.   GI: No nausea, vomiting, hematemesis, melena, or hematochezia.   : No urinary frequency, dysuria, or hematuria.   Integument: Negative.   Psychiatric: Negative.   Neuro: Negative.   Endocrinology: Negative.   Musculoskeletal: No myalgia.    VITAL SIGNS:  There were no vitals taken for this visit.  There is no height or weight on file to calculate BMI.  Wt Readings from Last 2 Encounters:   06/21/22 96.2 kg (212 lb)   05/20/22 96.4 kg (212 lb 8 oz)       LABS    Lab Results   Component Value Date    WBC 5.0 07/27/2018     Lab Results   Component Value Date    RBC 4.26 07/27/2018     Lab Results   Component Value Date    HGB 12.6 07/27/2018     Lab Results   Component Value Date    HCT 38.0 07/27/2018     No components found for: MCT  Lab Results   Component Value Date    MCV 89 07/27/2018     Lab Results   Component Value Date    MCH 29.6 07/27/2018     Lab Results   Component Value Date    MCHC 33.2 07/27/2018     Lab Results   Component Value Date    RDW 11.9 07/27/2018     Lab Results   Component Value Date     07/27/2018      Recent Labs   Lab  Test 02/22/22  1134 07/27/18  1330    138   POTASSIUM 4.5 4.6   CHLORIDE 108 104   CO2 28 27   ANIONGAP 4 6   GLC 98 84   BUN 12 12   CR 0.70 0.70   FRAN 9.7 9.3     Recent Labs   Lab Test 01/03/20  1402 07/27/18  1330   CHOL 235* 205*   HDL 87 79   * 113*   TRIG 61 66   NHDL 148* 126*        IMPRESSION, REPORT, PLAN:   1.  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   2.  Sinus of Valsalva 3 CM.  Ascending aorta 2.7 CM MRA 4/2022  3.  Obesity  4.  Depression/Anxiety  5.  ADHD        DISCUSSION:  It was a pleasure being involved in the care of Ms. Rich. We discussed that MRA needs to be repeated in the Spring.      I am so glad that she is doing so well.  She is visibly happier.  She has lost 30 pounds since August!  Will help her to get a new primary so she can get her ADHD meds and primary care taken care of.  She has about a month supply left and if she cant get in before a month I will refill till she can.      If she tests positive for covid, will give Paxlovid.     The BP is well controlled and will continue with Cozaar.       All questions were answered.  It was a pleasure to see her.  Please do not hesitate to contact me with any questions or concerns.         MARGARITA MARTINEZ MD    45 minutes face-face, documentation and review of records on day of visit

## 2022-12-23 NOTE — PROGRESS NOTES
Gill is a 22 year old who is being evaluated via a billable video visit.      Pt states in [place] for visit.     How would you like to obtain your AVS? [facility]  If the video visit is dropped, the invitation should be resent by: Text to cell phone: [phone]  Will anyone else be joining your video visit?   pts mother  Uvaldo Harmon, VF/CMA          Video-Visit Details    Type of service:  Video Visit     Originating Location (pt. Location): home    Distant Location (provider location):  [facility]   Platform used for Video Visit: [employer]ge     CARDIOLOGY CONSULTATION:       Ms. Rich is a delightful 22-year-old woman who is known to me.   She has a history of FBN1 mutation.  We also follow some of her siblings as well.  She had been on losartan 25 mg daily.     Gill was fairly active with sports in high school but has not been as active. She does have confirmed family members with FBN1 mutations including her sisters, Kerline and Jenae, and her brother, Cory, her father and 2 paternal uncles.  Her mom has multiple sclerosis.       MRA 1/6/2020 and the sinuses measured 2.7 and ascending 2.4 CM. We repeated an MRA 4/2022 and her sinuses are 3.0 and ascending aorta 2.7 CM.  We are planning to repeat her MRA next year.       When I saw Gill in March 2022 she was going through some life changes and depression; she had gained some weight and was moving home.  She had some untreated anxiety as well.  We started Prozac 20mg daily. She is working and [employer] now and is back on ADHD meds and has broken up with her boyfriend and she has lost 30 pounds since August!  She has a puppy that she is walking.  Her spirits are so much better.      Her dad was just diagnosed with Covid today and has no primary and gets care through our cardiology clinic. They were unable to get help through an online pharmacist evaluation and he has multiple risk factors for severe infection going into the holiday weekend.        PAST MEDICAL  HISTORY:  No past medical history on file.    CURRENT MEDICATIONS:  Current Outpatient Medications   Medication Sig Dispense Refill     amphetamine-dextroamphetamine (ADDERALL XR) 20 MG 24 hr capsule        FLUoxetine (PROZAC) 20 MG capsule Take 1 capsule (20 mg) by mouth daily 90 capsule 3     levonorgestrel (MIRENA, 52 MG,) 20 MCG/24HR IUD 52 mg       losartan (COZAAR) 25 MG tablet Take 1 tablet (25 mg) by mouth daily 90 tablet 3       PAST SURGICAL HISTORY:  No past surgical history on file.    ALLERGIES  Methylphenidate derivatives    FAMILY HX:  No family history on file.    SOCIAL HX:  Social History     Socioeconomic History     Marital status: Single     Spouse name: None     Number of children: None     Years of education: None     Highest education level: None   Tobacco Use     Smoking status: Never     Smokeless tobacco: Never       ROS:  Constitutional: No fever, chills, or sweats. No weight gain/loss.   ENT: No visual disturbance, ear ache, epistaxis, sore throat.   Allergies/Immunologic: Negative.   Respiratory: No cough, hemoptysis.   Cardiovascular: As per HPI.   GI: No nausea, vomiting, hematemesis, melena, or hematochezia.   : No urinary frequency, dysuria, or hematuria.   Integument: Negative.   Psychiatric: Negative.   Neuro: Negative.   Endocrinology: Negative.   Musculoskeletal: No myalgia.    VITAL SIGNS:  There were no vitals taken for this visit.  There is no height or weight on file to calculate BMI.  Wt Readings from Last 2 Encounters:   06/21/22 96.2 kg (212 lb)   05/20/22 96.4 kg (212 lb 8 oz)       LABS    Lab Results   Component Value Date    WBC 5.0 07/27/2018     Lab Results   Component Value Date    RBC 4.26 07/27/2018     Lab Results   Component Value Date    HGB 12.6 07/27/2018     Lab Results   Component Value Date    HCT 38.0 07/27/2018     No components found for: MCT  Lab Results   Component Value Date    MCV 89 07/27/2018     Lab Results   Component Value Date    MCH 29.6  07/27/2018     Lab Results   Component Value Date    MCHC 33.2 07/27/2018     Lab Results   Component Value Date    RDW 11.9 07/27/2018     Lab Results   Component Value Date     07/27/2018      Recent Labs   Lab Test 02/22/22  1134 07/27/18  1330    138   POTASSIUM 4.5 4.6   CHLORIDE 108 104   CO2 28 27   ANIONGAP 4 6   GLC 98 84   BUN 12 12   CR 0.70 0.70   FRAN 9.7 9.3     Recent Labs   Lab Test 01/03/20  1402 07/27/18  1330   CHOL 235* 205*   HDL 87 79   * 113*   TRIG 61 66   NHDL 148* 126*        IMPRESSION, REPORT, PLAN:   1.  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   2.  Sinus of Valsalva 3 CM.  Ascending aorta 2.7 CM MRA 4/2022  3.  Obesity  4.  Depression/Anxiety  5.  ADHD        DISCUSSION:  It was a pleasure being involved in the care of Ms. Rich. We discussed that MRA needs to be repeated in the Spring.      I am so glad that she is doing so well.  She is visibly happier.  She has lost 30 pounds since August!  Will help her to get a new primary so she can get her ADHD meds and primary care taken care of.  She has about a month supply left and if she cant get in before a month I will refill till she can.      If she tests positive for covid, will give Paxlovid.     The BP is well controlled and will continue with Cozaar.       All questions were answered.  It was a pleasure to see her.  Please do not hesitate to contact me with any questions or concerns.         MARGARITA MARTINEZ MD    45 minutes face-face, documentation and review of records on day of visit

## 2022-12-29 ENCOUNTER — TELEPHONE (OUTPATIENT)
Dept: CARDIOLOGY | Facility: CLINIC | Age: 22
End: 2022-12-29
Payer: COMMERCIAL

## 2022-12-29 NOTE — LETTER
January 26, 2023      Gill Rich  [address]  [place] [address]    Dear Gill,    Our records indicate that it is time for you to schedule your return visit with the [facility] in the CARDIOVASCULAR CONGENITAL CLINIC at the [facility] ([facility]).      To make your appointment, please call: [phone] or respond to this InterStelNet message, Monday - Friday, 8 A.M. - 4:30 P.M (Central Time Zone).    Please check with your insurance company about your benefits.  Some insurance plans provide different coverage levels for services at [facility] hospital-based clinics.     We look forward to hearing from you.    Sincerely,    Lissa MARQUEZ .  Clinic Coordinator  [facility]  Office: [phone]  Fax: [phone]

## 2023-01-26 NOTE — TELEPHONE ENCOUNTER
2nd attempt to reach out to pt to schedule follow up with March with MRA chest prior, no answer, LVM ,sent letter    [facility] ATTEMPTS call [phone] if pt wishes to be scheduled.

## 2023-01-31 ENCOUNTER — VIRTUAL VISIT (OUTPATIENT)
Dept: FAMILY MEDICINE | Facility: CLINIC | Age: 23
End: 2023-01-31
Payer: COMMERCIAL

## 2023-01-31 DIAGNOSIS — F90.0 ATTENTION-DEFICIT HYPERACTIVITY DISORDER, PREDOMINANTLY INATTENTIVE TYPE: Primary | ICD-10-CM

## 2023-01-31 DIAGNOSIS — Q87.40 MARFAN SYNDROME: ICD-10-CM

## 2023-01-31 DIAGNOSIS — F32.9 CURRENT EPISODE OF MAJOR DEPRESSIVE DISORDER WITHOUT PRIOR EPISODE, UNSPECIFIED DEPRESSION EPISODE SEVERITY: ICD-10-CM

## 2023-01-31 PROBLEM — F90.9 ADHD (ATTENTION DEFICIT HYPERACTIVITY DISORDER): Status: ACTIVE | Noted: 2023-01-31

## 2023-01-31 PROBLEM — E66.9 OBESITY: Status: ACTIVE | Noted: 2023-01-31

## 2023-01-31 PROBLEM — F84.5 ASPERGER'S DISORDER: Status: ACTIVE | Noted: 2023-01-31

## 2023-01-31 PROCEDURE — 96127 BRIEF EMOTIONAL/BEHAV ASSMT: CPT | Mod: 95 | Performed by: NURSE PRACTITIONER

## 2023-01-31 PROCEDURE — 99214 OFFICE O/P EST MOD 30 MIN: CPT | Mod: 95 | Performed by: NURSE PRACTITIONER

## 2023-01-31 RX ORDER — DEXTROAMPHETAMINE SACCHARATE, AMPHETAMINE ASPARTATE MONOHYDRATE, DEXTROAMPHETAMINE SULFATE AND AMPHETAMINE SULFATE 5; 5; 5; 5 MG/1; MG/1; MG/1; MG/1
20 CAPSULE, EXTENDED RELEASE ORAL DAILY
Qty: 30 CAPSULE | Refills: 0 | Status: SHIPPED | OUTPATIENT
Start: 2023-01-31 | End: 2023-03-02

## 2023-01-31 RX ORDER — DEXTROAMPHETAMINE SACCHARATE, AMPHETAMINE ASPARTATE MONOHYDRATE, DEXTROAMPHETAMINE SULFATE AND AMPHETAMINE SULFATE 5; 5; 5; 5 MG/1; MG/1; MG/1; MG/1
20 CAPSULE, EXTENDED RELEASE ORAL DAILY
Qty: 30 CAPSULE | Refills: 0 | Status: SHIPPED | OUTPATIENT
Start: 2023-04-03 | End: 2023-05-03

## 2023-01-31 RX ORDER — LOSARTAN POTASSIUM 25 MG/1
25 TABLET ORAL DAILY
Qty: 90 TABLET | Refills: 3 | Status: SHIPPED | OUTPATIENT
Start: 2023-01-31 | End: 2023-07-24

## 2023-01-31 RX ORDER — DEXTROAMPHETAMINE SACCHARATE, AMPHETAMINE ASPARTATE MONOHYDRATE, DEXTROAMPHETAMINE SULFATE AND AMPHETAMINE SULFATE 5; 5; 5; 5 MG/1; MG/1; MG/1; MG/1
20 CAPSULE, EXTENDED RELEASE ORAL DAILY
Qty: 30 CAPSULE | Refills: 0 | Status: SHIPPED | OUTPATIENT
Start: 2023-03-03 | End: 2023-04-02

## 2023-01-31 ASSESSMENT — PATIENT HEALTH QUESTIONNAIRE - PHQ9
10. IF YOU CHECKED OFF ANY PROBLEMS, HOW DIFFICULT HAVE THESE PROBLEMS MADE IT FOR YOU TO DO YOUR WORK, TAKE CARE OF THINGS AT HOME, OR GET ALONG WITH OTHER PEOPLE: NOT DIFFICULT AT ALL
SUM OF ALL RESPONSES TO PHQ QUESTIONS 1-9: 5
SUM OF ALL RESPONSES TO PHQ QUESTIONS 1-9: 5

## 2023-01-31 NOTE — PROGRESS NOTES
"Gill is a 22 year old who is being evaluated via a billable video visit.      How would you like to obtain your AVS? MyChart  If the video visit is dropped, the invitation should be resent by: Text to cell phone: [phone]  Will anyone else be joining your video visit? No          Assessment & Plan     Attention-deficit hyperactivity disorder, predominantly inattentive type  Well controlled on current dose, not using daily. PDMP reviewed.  Advised she should establish with a new PCP for further refills and to complete CSA which she is familiar with from her old clinic.    - amphetamine-dextroamphetamine (ADDERALL XR) 20 MG 24 hr capsule; Take 1 capsule (20 mg) by mouth daily for 30 days  - amphetamine-dextroamphetamine (ADDERALL XR) 20 MG 24 hr capsule; Take 1 capsule (20 mg) by mouth daily for 30 days  - amphetamine-dextroamphetamine (ADDERALL XR) 20 MG 24 hr capsule; Take 1 capsule (20 mg) by mouth daily for 30 days    Current episode of major depressive disorder without prior episode, unspecified depression episode severity  Stable on current dose, refills provided.    - FLUoxetine (PROZAC) 20 MG capsule; Take 1 capsule (20 mg) by mouth daily    Marfan syndrome  Reviewed last BP on file, well controlled.  Advised she should have routine physical with BP check for ongoing refills.    - losartan (COZAAR) 25 MG tablet; Take 1 tablet (25 mg) by mouth daily    Review of external notes as documented elsewhere in note  Prescription drug management         BMI:   Estimated body mass index is 38.16 kg/m  as calculated from the following:    Height as of 6/21/22: 1.588 m (5' 2.5\").    Weight as of 6/21/22: 96.2 kg (212 lb).   Weight management plan: Discussed healthy diet and exercise guidelines        Return in 3 months (on 4/30/2023) for ADHD MED RECHECK (3 MONTHS).   Follow-up Visit   Expected date:  Apr 30, 2023 (Approximate)      Follow Up Appointment Details:     Follow-up with whom?: Any Primary Care Provider    " "Follow-Up for what?: Adult Preventive    How?: In Person                    HEEBR Gallagher CNP  [facility] KIMO Garland is a 22 year old, presenting for the following health issues:  Medication Refill (ADHD)      Medication Refill    History of Present Illness       Reason for visit:  ADHD follow up    She eats 0-1 servings of fruits and vegetables daily.She consumes 2 sweetened beverage(s) daily.She exercises with enough effort to increase her heart rate 10 to 19 minutes per day.  She exercises with enough effort to increase her heart rate 3 or less days per week. She is missing 2 dose(s) of medications per week.  She is not taking prescribed medications regularly due to other.    Today's PHQ-9         PHQ-9 Total Score: 5    PHQ-9 Q9 Thoughts of better off dead/self-harm past 2 weeks :   Not at all    How difficult have these problems made it for you to do your work, take care of things at home, or get along with other people: Not difficult at all     Was following with a pediatrician who retired.  Has not set up a PCP in the [place] system yet.    Previously on concerta, but lost appetite, had headaches.  Now adderall.  Uses everyday that she works, but takes breaks on the days she is off.  Also needing refills of fluoxetine and losartan.  Mood is good overall, stable on current dose.  History of Asperger's and Marfan's in addition to ADHD.      Review of Systems   Constitutional, HEENT, cardiovascular, pulmonary, gi and gu systems are negative, except as otherwise noted.      Objective    Vitals - Patient Reported  Weight (Patient Reported): 85.7 kg (189 lb)  Height (Patient Reported): 157.5 cm (5' 2\")  BMI (Based on Pt Reported Ht/Wt): 34.57  Pain Score: No Pain (0)        Physical Exam   GENERAL: Healthy, alert and no distress  EYES: Eyes grossly normal to inspection.  No discharge or erythema, or obvious scleral/conjunctival abnormalities.  RESP: No audible wheeze, " cough, or visible cyanosis.  No visible retractions or increased work of breathing.    SKIN: Visible skin clear. No significant rash, abnormal pigmentation or lesions.  NEURO: Cranial nerves grossly intact.  Mentation and speech appropriate for age.  PSYCH: Mentation appears normal, affect normal/bright, judgement and insight intact, normal speech and appearance well-groomed.                Video-Visit Details    Type of service:  Video Visit     Originating Location (pt. Location): Home    Distant Location (provider location):  Off-site  Platform used for Video Visit: [employer]Well

## 2023-03-24 ENCOUNTER — TELEPHONE (OUTPATIENT)
Dept: FAMILY MEDICINE | Facility: CLINIC | Age: 23
End: 2023-03-24
Payer: COMMERCIAL

## 2023-03-24 NOTE — TELEPHONE ENCOUNTER
Patient calling clinic to see if she can get a refill for Adderall XR before her visit with Amy on 5/1/23.     RN notes patient has a future refill for Adderall XR for 4/3/23 by HEBER Gonzales CNP with [facility].     Patient states she only has 3 pills left. She states she picked up the medication on 3/3/23 and takes the medication 5 days a week. RN advised patient should have enough to get her to the next refill on 4/3/23 since she does not take the medication daily. She states she will run out in 3 days. RN advised patient to contact the provider's clinic that last prescribed the medication to see if they will refill sooner as the medication is a controlled substance and Amy has not had a visit with patient yet. Patient verbalized understanding and states she will contact [facility]'s team.     JONATHON Robles, RN  [facility]

## 2023-04-27 ASSESSMENT — ENCOUNTER SYMPTOMS
CHILLS: 0
EYE PAIN: 0
SHORTNESS OF BREATH: 0
HEARTBURN: 0
ARTHRALGIAS: 0
PARESTHESIAS: 0
MYALGIAS: 0
ABDOMINAL PAIN: 0
HEMATOCHEZIA: 0
NAUSEA: 0
FEVER: 0
HEMATURIA: 0
SORE THROAT: 0
DIZZINESS: 0
DIARRHEA: 0
COUGH: 0
NERVOUS/ANXIOUS: 0
HEADACHES: 0
BREAST MASS: 0
CONSTIPATION: 0
JOINT SWELLING: 0
PALPITATIONS: 0
DYSURIA: 0
WEAKNESS: 0
FREQUENCY: 0

## 2023-05-01 ENCOUNTER — OFFICE VISIT (OUTPATIENT)
Dept: FAMILY MEDICINE | Facility: CLINIC | Age: 23
End: 2023-05-01
Attending: NURSE PRACTITIONER
Payer: COMMERCIAL

## 2023-05-01 VITALS
DIASTOLIC BLOOD PRESSURE: 81 MMHG | OXYGEN SATURATION: 98 % | BODY MASS INDEX: 31.83 KG/M2 | TEMPERATURE: 98 F | WEIGHT: 173 LBS | RESPIRATION RATE: 12 BRPM | SYSTOLIC BLOOD PRESSURE: 126 MMHG | HEART RATE: 73 BPM | HEIGHT: 62 IN

## 2023-05-01 DIAGNOSIS — Z13.6 CARDIOVASCULAR SCREENING; LDL GOAL LESS THAN 160: ICD-10-CM

## 2023-05-01 DIAGNOSIS — Z00.00 ROUTINE GENERAL MEDICAL EXAMINATION AT A HEALTH CARE FACILITY: Primary | ICD-10-CM

## 2023-05-01 DIAGNOSIS — Z11.3 SCREENING FOR STDS (SEXUALLY TRANSMITTED DISEASES): ICD-10-CM

## 2023-05-01 DIAGNOSIS — F90.0 ATTENTION-DEFICIT HYPERACTIVITY DISORDER, PREDOMINANTLY INATTENTIVE TYPE: ICD-10-CM

## 2023-05-01 PROCEDURE — 87491 CHLMYD TRACH DNA AMP PROBE: CPT | Performed by: NURSE PRACTITIONER

## 2023-05-01 PROCEDURE — 87591 N.GONORRHOEAE DNA AMP PROB: CPT | Performed by: NURSE PRACTITIONER

## 2023-05-01 PROCEDURE — 99213 OFFICE O/P EST LOW 20 MIN: CPT | Mod: 25 | Performed by: NURSE PRACTITIONER

## 2023-05-01 PROCEDURE — 36415 COLL VENOUS BLD VENIPUNCTURE: CPT | Performed by: NURSE PRACTITIONER

## 2023-05-01 PROCEDURE — 99395 PREV VISIT EST AGE 18-39: CPT | Performed by: NURSE PRACTITIONER

## 2023-05-01 PROCEDURE — 80048 BASIC METABOLIC PNL TOTAL CA: CPT | Performed by: NURSE PRACTITIONER

## 2023-05-01 PROCEDURE — 80061 LIPID PANEL: CPT | Performed by: NURSE PRACTITIONER

## 2023-05-01 RX ORDER — DEXTROAMPHETAMINE SACCHARATE, AMPHETAMINE ASPARTATE MONOHYDRATE, DEXTROAMPHETAMINE SULFATE AND AMPHETAMINE SULFATE 5; 5; 5; 5 MG/1; MG/1; MG/1; MG/1
20 CAPSULE, EXTENDED RELEASE ORAL DAILY
Qty: 30 CAPSULE | Refills: 0 | Status: SHIPPED | OUTPATIENT
Start: 2023-05-01 | End: 2023-05-31

## 2023-05-01 RX ORDER — DEXTROAMPHETAMINE SACCHARATE, AMPHETAMINE ASPARTATE MONOHYDRATE, DEXTROAMPHETAMINE SULFATE AND AMPHETAMINE SULFATE 5; 5; 5; 5 MG/1; MG/1; MG/1; MG/1
20 CAPSULE, EXTENDED RELEASE ORAL DAILY
Qty: 30 CAPSULE | Refills: 0 | Status: SHIPPED | OUTPATIENT
Start: 2023-06-01 | End: 2023-12-12

## 2023-05-01 RX ORDER — DEXTROAMPHETAMINE SACCHARATE, AMPHETAMINE ASPARTATE MONOHYDRATE, DEXTROAMPHETAMINE SULFATE AND AMPHETAMINE SULFATE 5; 5; 5; 5 MG/1; MG/1; MG/1; MG/1
20 CAPSULE, EXTENDED RELEASE ORAL DAILY
Qty: 30 CAPSULE | Refills: 0 | Status: SHIPPED | OUTPATIENT
Start: 2023-07-02 | End: 2023-08-01

## 2023-05-01 ASSESSMENT — ENCOUNTER SYMPTOMS
FREQUENCY: 0
PALPITATIONS: 0
DIZZINESS: 0
CHILLS: 0
NERVOUS/ANXIOUS: 0
BREAST MASS: 0
FEVER: 0
ARTHRALGIAS: 0
EYE PAIN: 0
HEMATOCHEZIA: 0
NAUSEA: 0
HEARTBURN: 0
COUGH: 0
ABDOMINAL PAIN: 0
SORE THROAT: 0
SHORTNESS OF BREATH: 0
DIARRHEA: 0
CONSTIPATION: 0
DYSURIA: 0
WEAKNESS: 0
PARESTHESIAS: 0
MYALGIAS: 0
HEMATURIA: 0
HEADACHES: 0
JOINT SWELLING: 0

## 2023-05-01 NOTE — PROGRESS NOTES
SUBJECTIVE:   CC: Gill is an 22 year old who presents for preventive health visit.       5/1/2023     2:51 PM   Additional Questions   Roomed by twan   Accompanied by self         5/1/2023     2:51 PM   Patient Reported Additional Medications   Patient reports taking the following new medications none       Healthy Habits:     Getting at least 3 servings of Calcium per day:  NO    Bi-annual eye exam:  Yes    Dental care twice a year:  NO    Sleep apnea or symptoms of sleep apnea:  None    Diet:  Regular (no restrictions)    Frequency of exercise:  2-3 days/week    Duration of exercise:  15-30 minutes    Taking medications regularly:  Yes    Medication side effects:  None    PHQ-2 Total Score: 0    Additional concerns today:  No    Declines pap today. Will do later this year. Declines hep C and HIV screening. Ok with chlamydia/gonorrhea screening    Takes adderall XR 20 mg. Dose working well  No drug use. Drinks alcohol occasionally  Previously saw Dr. Thurman at [facility]. Her provider retired and patient turned 22      Hx marfans syndrome for which she follows with cardiology    Today's PHQ-2 Score:       4/27/2023     5:38 PM   PHQ-2 ( 1999 [employer])   Q1: Little interest or pleasure in doing things 0   Q2: Feeling down, depressed or hopeless 0   PHQ-2 Score 0   Q1: Little interest or pleasure in doing things Not at all   Q2: Feeling down, depressed or hopeless Not at all   PHQ-2 Score 0       Have you ever done Advance Care Planning? (For example, a Health Directive, POLST, or a discussion with a medical provider or your loved ones about your wishes): No, advance care planning information given to patient to review.  Patient plans to discuss their wishes with loved ones or provider.      Social History     Tobacco Use     Smoking status: Never     Smokeless tobacco: Never   Vaping Use     Vaping status: Never Used   Substance Use Topics     Alcohol use: Not on file             4/27/2023     5:38 PM   Alcohol  Use   Prescreen: >3 drinks/day or >7 drinks/week? No     Reviewed orders with patient.  Reviewed health maintenance and updated orders accordingly - Yes  Lab work is in process  Labs reviewed in EPIC  BP Readings from Last 3 Encounters:   05/01/23 126/81   05/20/22 110/70   03/18/22 118/78    Wt Readings from Last 3 Encounters:   05/01/23 78.5 kg (173 lb)   06/21/22 96.2 kg (212 lb)   05/20/22 96.4 kg (212 lb 8 oz)                  Patient Active Problem List   Diagnosis     ADHD (attention deficit hyperactivity disorder)     Asperger's disorder     Marfan's syndrome     Obesity     No past surgical history on file.    Social History     Tobacco Use     Smoking status: Never     Smokeless tobacco: Never   Vaping Use     Vaping status: Never Used   Substance Use Topics     Alcohol use: Not on file     No family history on file.      Current Outpatient Medications   Medication Sig Dispense Refill     amphetamine-dextroamphetamine (ADDERALL XR) 20 MG 24 hr capsule Take 1 capsule (20 mg) by mouth daily for 30 days 30 capsule 0     [START ON 6/1/2023] amphetamine-dextroamphetamine (ADDERALL XR) 20 MG 24 hr capsule Take 1 capsule (20 mg) by mouth daily for 30 days 30 capsule 0     [START ON 7/2/2023] amphetamine-dextroamphetamine (ADDERALL XR) 20 MG 24 hr capsule Take 1 capsule (20 mg) by mouth daily for 30 days 30 capsule 0     amphetamine-dextroamphetamine (ADDERALL XR) 20 MG 24 hr capsule Take 1 capsule (20 mg) by mouth daily for 30 days 30 capsule 0     FLUoxetine (PROZAC) 20 MG capsule Take 1 capsule (20 mg) by mouth daily 90 capsule 3     levonorgestrel (MIRENA, 52 MG,) 20 MCG/24HR IUD 52 mg       losartan (COZAAR) 25 MG tablet Take 1 tablet (25 mg) by mouth daily 90 tablet 3     Allergies   Allergen Reactions     Methylphenidate Derivatives Headache and Nausea       Breast Cancer Screening:        History of abnormal Pap smear: NO - age 21-29 PAP every 3 years recommended     Reviewed and updated as needed this  "visit by clinical staff   Tobacco  Allergies  Meds              Reviewed and updated as needed this visit by Provider                 No past medical history on file.   No past surgical history on file.    Review of Systems   Constitutional: Negative for chills and fever.   HENT: Negative for congestion, ear pain, hearing loss and sore throat.    Eyes: Negative for pain and visual disturbance.   Respiratory: Negative for cough and shortness of breath.    Cardiovascular: Negative for chest pain, palpitations and peripheral edema.   Gastrointestinal: Negative for abdominal pain, constipation, diarrhea, heartburn, hematochezia and nausea.   Breasts:  Negative for tenderness, breast mass and discharge.   Genitourinary: Negative for dysuria, frequency, genital sores, hematuria, pelvic pain, urgency, vaginal bleeding and vaginal discharge.   Musculoskeletal: Negative for arthralgias, joint swelling and myalgias.   Skin: Negative for rash.   Neurological: Negative for dizziness, weakness, headaches and paresthesias.   Psychiatric/Behavioral: Negative for mood changes. The patient is not nervous/anxious.           OBJECTIVE:   /81 (BP Location: Left arm, Patient Position: Sitting, Cuff Size: Adult Regular)   Pulse 73   Temp 98  F (36.7  C) (Oral)   Resp 12   Ht 1.568 m (5' 1.75\")   Wt 78.5 kg (173 lb)   LMP  (LMP Unknown)   SpO2 98%   BMI 31.90 kg/m    Physical Exam  GENERAL: healthy, alert and no distress  EYES: Eyes grossly normal to inspection, PERRL and conjunctivae and sclerae normal  HENT: ear canals and TM's normal, nose and mouth without ulcers or lesions  NECK: no adenopathy, no asymmetry, masses, or scars and thyroid normal to palpation  RESP: lungs clear to auscultation - no rales, rhonchi or wheezes  CV: regular rate and rhythm, normal S1 S2, no S3 or S4, no murmur, click or rub, no peripheral edema and peripheral pulses strong  ABDOMEN: soft, nontender, no hepatosplenomegaly, no masses and " bowel sounds normal  MS: no gross musculoskeletal defects noted, no edema  SKIN: no suspicious lesions or rashes  NEURO: Normal strength and tone, mentation intact and speech normal  PSYCH: mentation appears normal, affect normal/bright    Diagnostic Test Results:  Labs reviewed in Epic  No results found for this or any previous visit (from the past 24 hour(s)).    ASSESSMENT/PLAN:   (Z00.00) Routine general medical examination at a health care facility  (primary encounter diagnosis)  Declines pap, hiv, hep c  Plan: Basic metabolic panel  (Ca, Cl, CO2, Creat,         Gluc, K, Na, BUN)            (F90.0) Attention-deficit hyperactivity disorder, predominantly inattentive type  Comment: stable. Refilled.   Plan: amphetamine-dextroamphetamine (ADDERALL XR) 20         MG 24 hr capsule, amphetamine-dextroamphetamine        (ADDERALL XR) 20 MG 24 hr capsule,         amphetamine-dextroamphetamine (ADDERALL XR) 20         MG 24 hr capsule            (Z11.3) Screening for STDs (sexually transmitted diseases)  Comment: asymptomatic.  Plan: NEISSERIA GONORRHOEA PCR, CHLAMYDIA TRACHOMATIS        PCR            (Z13.6) CARDIOVASCULAR SCREENING; LDL GOAL LESS THAN 160  Comment: fasting  Plan: Lipid panel reflex to direct LDL Fasting                    COUNSELING:  Reviewed preventive health counseling, as reflected in patient instructions       Regular exercise       Healthy diet/nutrition        She reports that she has never smoked. She has never used smokeless tobacco.    The benefits, risks and potential side effects were discussed in detail. Black box warnings discussed as relevant. All patient questions were answered. The patient was instructed to follow up immediately if any adverse reactions develop.    Return precautions discussed, including when to seek urgent/emergent care.    Patient verbalizes understanding and agrees with plan of care. Patient stable for discharge.          HEBER ESTEVEZ [facility]  [facility]

## 2023-05-02 LAB
ANION GAP SERPL CALCULATED.3IONS-SCNC: 9 MMOL/L (ref 3–14)
BUN SERPL-MCNC: 11 MG/DL (ref 7–30)
C TRACH DNA SPEC QL NAA+PROBE: NEGATIVE
CALCIUM SERPL-MCNC: 9.6 MG/DL (ref 8.5–10.1)
CHLORIDE BLD-SCNC: 107 MMOL/L (ref 94–109)
CHOLEST SERPL-MCNC: 189 MG/DL
CO2 SERPL-SCNC: 25 MMOL/L (ref 20–32)
CREAT SERPL-MCNC: 0.65 MG/DL (ref 0.52–1.04)
FASTING STATUS PATIENT QL REPORTED: NO
GFR SERPL CREATININE-BSD FRML MDRD: >90 ML/MIN/1.73M2
GLUCOSE BLD-MCNC: 85 MG/DL (ref 70–99)
HDLC SERPL-MCNC: 78 MG/DL
LDLC SERPL CALC-MCNC: 104 MG/DL
N GONORRHOEA DNA SPEC QL NAA+PROBE: NEGATIVE
NONHDLC SERPL-MCNC: 111 MG/DL
POTASSIUM BLD-SCNC: 4.1 MMOL/L (ref 3.4–5.3)
SODIUM SERPL-SCNC: 141 MMOL/L (ref 133–144)
TRIGL SERPL-MCNC: 36 MG/DL

## 2023-06-30 ENCOUNTER — HOSPITAL ENCOUNTER (OUTPATIENT)
Dept: MRI IMAGING | Facility: CLINIC | Age: 23
Discharge: HOME | End: 2023-06-30
Attending: INTERNAL MEDICINE | Admitting: INTERNAL MEDICINE
Payer: COMMERCIAL

## 2023-06-30 DIAGNOSIS — Q87.40 MARFAN'S SYNDROME: ICD-10-CM

## 2023-06-30 PROCEDURE — 255N000002 HC RX 255 OP 636: Mod: JZ | Performed by: INTERNAL MEDICINE

## 2023-06-30 PROCEDURE — 71555 MRI ANGIO CHEST W OR W/O DYE: CPT

## 2023-06-30 PROCEDURE — 71555 MRI ANGIO CHEST W OR W/O DYE: CPT | Mod: 26 | Performed by: INTERNAL MEDICINE

## 2023-06-30 PROCEDURE — A9585 GADOBUTROL INJECTION: HCPCS | Mod: JZ | Performed by: INTERNAL MEDICINE

## 2023-06-30 RX ORDER — GADOBUTROL 604.72 MG/ML
10 INJECTION INTRAVENOUS ONCE
Status: COMPLETED | OUTPATIENT
Start: 2023-06-30 | End: 2023-06-30

## 2023-06-30 RX ADMIN — GADOBUTROL 10 ML: 604.72 INJECTION INTRAVENOUS at 07:33

## 2023-07-24 ENCOUNTER — VIRTUAL VISIT (OUTPATIENT)
Dept: FAMILY MEDICINE | Facility: CLINIC | Age: 23
End: 2023-07-24
Payer: COMMERCIAL

## 2023-07-24 DIAGNOSIS — Q87.40 MARFAN SYNDROME: ICD-10-CM

## 2023-07-24 DIAGNOSIS — F90.0 ATTENTION-DEFICIT HYPERACTIVITY DISORDER, PREDOMINANTLY INATTENTIVE TYPE: ICD-10-CM

## 2023-07-24 DIAGNOSIS — F32.9 CURRENT EPISODE OF MAJOR DEPRESSIVE DISORDER WITHOUT PRIOR EPISODE, UNSPECIFIED DEPRESSION EPISODE SEVERITY: ICD-10-CM

## 2023-07-24 PROCEDURE — 99213 OFFICE O/P EST LOW 20 MIN: CPT | Mod: VID | Performed by: NURSE PRACTITIONER

## 2023-07-24 RX ORDER — DEXTROAMPHETAMINE SACCHARATE, AMPHETAMINE ASPARTATE MONOHYDRATE, DEXTROAMPHETAMINE SULFATE AND AMPHETAMINE SULFATE 5; 5; 5; 5 MG/1; MG/1; MG/1; MG/1
20 CAPSULE, EXTENDED RELEASE ORAL DAILY
Qty: 30 CAPSULE | Refills: 0 | Status: CANCELLED | OUTPATIENT
Start: 2023-07-24

## 2023-07-24 ASSESSMENT — PATIENT HEALTH QUESTIONNAIRE - PHQ9: SUM OF ALL RESPONSES TO PHQ QUESTIONS 1-9: 5

## 2023-07-24 NOTE — PROGRESS NOTES
"Gill is a 23 year old who is being evaluated via a billable video visit.      How would you like to obtain your AVS? MyChart  If the video visit is dropped, the invitation should be resent by: Text to cell phone: [phone]  Will anyone else be joining your video visit? No          Assessment & Plan     Current episode of major depressive disorder without prior episode, unspecified depression episode severity  Stable. Doing well. No red flags. Refilled. Interested in therapy -referral placed.   - FLUoxetine (PROZAC) 20 MG capsule; Take 1 capsule (20 mg) by mouth daily    Marfan syndrome  Refilled. Not pregnant or breastfeeding.   - losartan (COZAAR) 25 MG tablet; Take 1 tablet (25 mg) by mouth daily    Attention-deficit hyperactivity disorder, predominantly inattentive type  Refilled. Doing well.   - amphetamine-dextroamphetamine (ADDERALL XR) 20 MG 24 hr capsule; Take 1 capsule (20 mg) by mouth daily for 30 days  - amphetamine-dextroamphetamine (ADDERALL XR) 20 MG 24 hr capsule; Take 1 capsule (20 mg) by mouth daily for 30 days  - amphetamine-dextroamphetamine (ADDERALL XR) 20 MG 24 hr capsule; Take 1 capsule (20 mg) by mouth daily for 30 days       BMI:   Estimated body mass index is 31.9 kg/m  as calculated from the following:    Height as of 5/1/23: 1.568 m (5' 1.75\").    Weight as of 5/1/23: 78.5 kg (173 lb).       The benefits, risks and potential side effects were discussed in detail. Black box warnings discussed as relevant. All patient questions were answered. The patient was instructed to follow up immediately if any adverse reactions develop.    Return precautions discussed, including when to seek urgent/emergent care.    Patient verbalizes understanding and agrees with plan of care.       HEBER ESTEVEZ [facility]    Keon Garland is a 23 year old, presenting for the following health issues:  No chief complaint on file.      5/1/2023     2:51 PM "   Additional Questions   Roomed by twan   Accompanied by self     HPI     Pt would like all meds refilled         Pleasant 23 year old female initiated a virtual visit to request medication refills. She has been doing well with current regimen without side effects. She takes adderall XR 20 mg daily, fluoxetine 20 mg daily and losartan 25 mg daily for ADHD, depression and marfan syndrome respectively. Good appetite. Weight stable. No trouble sleeping. No thoughts of harm. Feels safe. Stable. Not pregnant or breastfeeding.      Review of Systems   Constitutional, HEENT, cardiovascular, pulmonary, GI, , musculoskeletal, neuro, skin, endocrine and psych systems are negative, except as otherwise noted.      Objective           Vitals:  No vitals were obtained today due to virtual visit.    Physical Exam   GENERAL: Healthy, alert and no distress  EYES: Eyes grossly normal to inspection.  No discharge or erythema, or obvious scleral/conjunctival abnormalities.  RESP: No audible wheeze, cough, or visible cyanosis.  No visible retractions or increased work of breathing.    SKIN: Visible skin clear. No significant rash, abnormal pigmentation or lesions.  NEURO: Cranial nerves grossly intact.  Mentation and speech appropriate for age.  PSYCH: Mentation appears normal, affect normal/bright, judgement and insight intact, normal speech and appearance well-groomed.                Video-Visit Details    Type of service:  Video Visit     Originating Location (pt. Location): Home    Distant Location (provider location):  On-site  Platform used for Video Visit: trustedsafe

## 2023-07-24 NOTE — LETTER
July 25, 2023      To Whom It May Concern,    Gill Rich is a patient of our clinic. She has an emotional support dog that aids in her mental health. Gill follows all treatment recommendations. She is stable and doing well at this time and her dog is an integral part of her wellbeing.    Sincerely,    HEBER Pleitez CNP

## 2023-07-25 RX ORDER — LOSARTAN POTASSIUM 25 MG/1
25 TABLET ORAL DAILY
Qty: 90 TABLET | Refills: 1 | Status: SHIPPED | OUTPATIENT
Start: 2023-07-25 | End: 2024-04-23

## 2023-07-25 RX ORDER — DEXTROAMPHETAMINE SACCHARATE, AMPHETAMINE ASPARTATE MONOHYDRATE, DEXTROAMPHETAMINE SULFATE AND AMPHETAMINE SULFATE 5; 5; 5; 5 MG/1; MG/1; MG/1; MG/1
20 CAPSULE, EXTENDED RELEASE ORAL DAILY
Qty: 30 CAPSULE | Refills: 0 | Status: SHIPPED | OUTPATIENT
Start: 2023-09-16 | End: 2023-10-16

## 2023-07-25 RX ORDER — DEXTROAMPHETAMINE SACCHARATE, AMPHETAMINE ASPARTATE MONOHYDRATE, DEXTROAMPHETAMINE SULFATE AND AMPHETAMINE SULFATE 5; 5; 5; 5 MG/1; MG/1; MG/1; MG/1
20 CAPSULE, EXTENDED RELEASE ORAL DAILY
Qty: 30 CAPSULE | Refills: 0 | Status: SHIPPED | OUTPATIENT
Start: 2023-08-17 | End: 2023-09-16

## 2023-07-25 RX ORDER — DEXTROAMPHETAMINE SACCHARATE, AMPHETAMINE ASPARTATE MONOHYDRATE, DEXTROAMPHETAMINE SULFATE AND AMPHETAMINE SULFATE 5; 5; 5; 5 MG/1; MG/1; MG/1; MG/1
20 CAPSULE, EXTENDED RELEASE ORAL DAILY
Qty: 30 CAPSULE | Refills: 0 | Status: SHIPPED | OUTPATIENT
Start: 2023-10-16 | End: 2023-11-15

## 2023-08-04 ENCOUNTER — VIRTUAL VISIT (OUTPATIENT)
Dept: PSYCHOLOGY | Facility: CLINIC | Age: 23
End: 2023-08-04
Payer: COMMERCIAL

## 2023-08-04 DIAGNOSIS — F33.9 MAJOR DEPRESSIVE DISORDER, RECURRENT EPISODE WITH ANXIOUS DISTRESS (H): Primary | ICD-10-CM

## 2023-08-04 PROCEDURE — 90834 PSYTX W PT 45 MINUTES: CPT | Mod: VID

## 2023-08-04 ASSESSMENT — ANXIETY QUESTIONNAIRES
7. FEELING AFRAID AS IF SOMETHING AWFUL MIGHT HAPPEN: NOT AT ALL
5. BEING SO RESTLESS THAT IT IS HARD TO SIT STILL: NOT AT ALL
4. TROUBLE RELAXING: NOT AT ALL
1. FEELING NERVOUS, ANXIOUS, OR ON EDGE: NOT AT ALL
GAD7 TOTAL SCORE: 0
3. WORRYING TOO MUCH ABOUT DIFFERENT THINGS: NOT AT ALL
6. BECOMING EASILY ANNOYED OR IRRITABLE: NOT AT ALL
2. NOT BEING ABLE TO STOP OR CONTROL WORRYING: NOT AT ALL
GAD7 TOTAL SCORE: 0

## 2023-08-04 ASSESSMENT — PATIENT HEALTH QUESTIONNAIRE - PHQ9
SUM OF ALL RESPONSES TO PHQ QUESTIONS 1-9: 3
10. IF YOU CHECKED OFF ANY PROBLEMS, HOW DIFFICULT HAVE THESE PROBLEMS MADE IT FOR YOU TO DO YOUR WORK, TAKE CARE OF THINGS AT HOME, OR GET ALONG WITH OTHER PEOPLE: SOMEWHAT DIFFICULT
SUM OF ALL RESPONSES TO PHQ QUESTIONS 1-9: 3

## 2023-08-04 NOTE — PROGRESS NOTES
[facility]   Mental Health & Addiction Services     Progress Note - Initial Visit    Patient  Name:  Gill Rich Date: 23           Service Type: Individual     Visit Start Time: 9:00 am  Visit End Time: 9:45 am    Visit #: 1    Attendees: Client attended alone    Service Modality:  Video Visit:      Provider verified identity through the following two step process.  Patient provided:  Patient  and Patient address    Telemedicine Visit: The patient's condition can be safely assessed and treated via synchronous audio and visual telemedicine encounter.      Reason for Telemedicine Visit: Patient has requested telehealth visit    Originating Site (Patient Location): Patient's home    Distant Site (Provider Location): Provider Remote Setting- Home Office    Consent:  The patient/guardian has verbally consented to: the potential risks and benefits of telemedicine (video visit) versus in person care; bill my insurance or make self-payment for services provided; and responsibility for payment of non-covered services.     Patient would like the video invitation sent by:  My Chart    Mode of Communication:  Video Conference via Amwell    Distant Location (Provider):  Off-site    As the provider I attest to compliance with applicable laws and regulations related to telemedicine.       DATA:   Interactive Complexity: No   Crisis: No     Presenting Concerns/  Current Stressors:   Patient reports history of depression, anxiety, ASD, and ADHD. Patient was diagnosed with ASD and ADHD in elementary school. Patient is experiencing anxiety and is seeking counseling for support. Patient was tearful during session, denies SI, denies SIB.      ASSESSMENT:  Mental Status Assessment:  Appearance:   Appropriate   Eye Contact:   Fair   Psychomotor Behavior: Normal   Attitude:   Cooperative   Orientation:   All  Speech   Rate / Production: Normal/ Responsive   Volume:  Normal   Mood:    Anxious  Depressed    Affect:    Appropriate   Thought Content:  Clear   Thought Form:  Logical   Insight:    Good       Safety Issues and Plan for Safety and Risk Management:   Jackson Suicide Severity Rating Scale (Lifetime/Recent)      8/4/2023     9:13 AM   Jackson Suicide Severity Rating (Lifetime/Recent)   Q1 Wish to be Dead (Lifetime) N   Q2 Non-Specific Active Suicidal Thoughts (Lifetime) N   Actual Attempt (Lifetime) N   Has subject engaged in non-suicidal self-injurious behavior? (Lifetime) N   Calculated C-SSRS Risk Score (Lifetime/Recent) No Risk Indicated     Patient denies current fears or concerns for personal safety.  Patient denies current or recent suicidal ideation or behaviors.  Patient denies current or recent homicidal ideation or behaviors.  Patient denies current or recent self injurious behavior or ideation.  Patient denies other safety concerns.  Recommended that patient call 911 or go to the local ED should there be a change in any of these risk factors.  Patient reports there are no firearms in the house.     Diagnostic Criteria:  Major Depressive Disorder  A) Recurrent episode(s) - symptoms have been present during the same 2-week period and represent a change from previous functioning 5 or more symptoms (required for diagnosis)   - Depressed mood. Note: In children and adolescents, can be irritable mood.     - Significant weight loss when not dieting decrease in appetite.    - Mix of increased and decreased sleep.    - Fatigue or loss of energy.    - Diminished ability to think or concentrate, or indecisiveness.   B) The symptoms cause clinically significant distress or impairment in social, occupational, or other important areas of functioning  C) The episode is not attributable to the physiological effects of a substance or to another medical condition  D) The occurence of major depressive episode is not better explained by other thought / psychotic disorders  E) There has never been a manic episode or  hypomanic episode      DSM5 Diagnoses: (Sustained by DSM5 Criteria Listed Above)  Diagnoses: 296.31 (F33.0) Major Depressive Disorder, Recurrent Episode, Mild _ and With anxious distress  Psychosocial & Contextual Factors: 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    Intervention:   Patient processed the work stressors and difficulties managing upset customers. She identified believing it is her job to fix everything, even if she does not know how to fix the problem. Writer taught CBT and helped patient identify countering statement of: I am doing my best.  Writer used encouragement, validation, and reflective listening skills.   Collateral Reports Completed:  Not Applicable      PLAN: (Homework, other):  1. Provider will continue Diagnostic Assessment.  Patient was given the following to do until next session:  will practice countering affirmations.    2. Provider recommended the following referrals: none at this time.    3.  Suicide Risk and Safety Concerns were assessed for Gill Rich.    Patient meets the following risk assessment and triage: Patient denied any current/recent/lifetime history of suicidal ideation and/or behaviors.  No safety plan indicated at this time.       Kristi Hicks MA, LPC  August 5, 2023  Note reviewed and clinical supervision by LINDA Rudolph, LICSW 8/7/2023

## 2023-08-08 ENCOUNTER — OFFICE VISIT (OUTPATIENT)
Dept: CARDIOLOGY | Facility: CLINIC | Age: 23
End: 2023-08-08
Attending: INTERNAL MEDICINE
Payer: COMMERCIAL

## 2023-08-08 VITALS
BODY MASS INDEX: 32.26 KG/M2 | OXYGEN SATURATION: 98 % | DIASTOLIC BLOOD PRESSURE: 83 MMHG | HEIGHT: 62 IN | SYSTOLIC BLOOD PRESSURE: 124 MMHG | HEART RATE: 86 BPM | WEIGHT: 175.3 LBS

## 2023-08-08 DIAGNOSIS — Q87.40 MARFAN'S SYNDROME: ICD-10-CM

## 2023-08-08 DIAGNOSIS — Q24.9 ADULT CONGENITAL HEART DISEASE: Primary | ICD-10-CM

## 2023-08-08 PROCEDURE — 93005 ELECTROCARDIOGRAM TRACING: CPT

## 2023-08-08 PROCEDURE — 93010 ELECTROCARDIOGRAM REPORT: CPT | Performed by: INTERNAL MEDICINE

## 2023-08-08 PROCEDURE — 99215 OFFICE O/P EST HI 40 MIN: CPT | Performed by: INTERNAL MEDICINE

## 2023-08-08 PROCEDURE — 99213 OFFICE O/P EST LOW 20 MIN: CPT | Performed by: INTERNAL MEDICINE

## 2023-08-08 ASSESSMENT — PAIN SCALES - GENERAL: PAINLEVEL: NO PAIN (0)

## 2023-08-08 NOTE — NURSING NOTE
Cardiac Testing: Patient given instructions regarding  echocardiogram . Discussed purpose, preparation, procedure and when to expect results reported back to the patient. Patient demonstrated understanding of this information and agreed to call with further questions or concerns.    Med Reconcile: Reviewed and verified all current medications with the patient. The updated medication list was printed and given to the patient.    Return Appointment: Patient given instructions regarding scheduling next clinic visit. Patient demonstrated understanding of this information and agreed to call with further questions or concerns.    Patient stated she understood all health information given and agreed to call with further questions or concerns.

## 2023-08-08 NOTE — LETTER
8/8/2023      RE: Gill Rich  [address]  [facility] [address]       Dear Colleague,    Thank you for the opportunity to participate in the care of your patient, Gill Rich, at the [facility] [place] [facility]. Please see a copy of my visit note below.    CARDIOLOGY CONSULTATION:       Ms. Rich is a delightful 23-year-old woman who is known to me.   She has a history of FBN1 mutation.  We also follow some of her siblings as well.  She had been on losartan 25 mg daily.     Gill was fairly active with sports in high school but has not been as active. She does have confirmed family members with FBN1 mutations including her sisters, Kerline and Jenae, and her brother, Cory, her father and 2 paternal uncles.  Her mom has multiple sclerosis.      MRA of the aorta was last done 1/6/2020 and the sinuses measured 2.7 and ascending 2.4 CM. We repeated an MRA 4/2022 and her sinuses are 3.0 and ascending aorta 2.7 CM.  MRA 6/30/23 her sinuses measured 2.8 CM and ascending aorta 3.1 CM, so stable.   .       When I saw Gill in March 2022 she was going through some life changes and depression; she had gained some weight and was moving home.  She had some untreated anxiety as well.  We started Prozac 20mg daily. She is seeing a therapist. She started working at [employer] now and went back on ADHD meds, broke up with her toxic boyfriend and lost 60 lbs. She also got a puppy, Dalila, that is now a year. She is going to start volunteering at the [employer] [address] center for wild animals with baby squirrels and is looking forward to that.       PAST MEDICAL HISTORY:  No past medical history on file.    CURRENT MEDICATIONS:  Current Outpatient Medications   Medication Sig Dispense Refill    [START ON 8/17/2023] amphetamine-dextroamphetamine (ADDERALL XR) 20 MG 24 hr capsule Take 1 capsule (20 mg) by mouth daily for 30 days 30 capsule 0    [START ON 10/16/2023]  "amphetamine-dextroamphetamine (ADDERALL XR) 20 MG 24 hr capsule Take 1 capsule (20 mg) by mouth daily for 30 days 30 capsule 0    [START ON 9/16/2023] amphetamine-dextroamphetamine (ADDERALL XR) 20 MG 24 hr capsule Take 1 capsule (20 mg) by mouth daily for 30 days 30 capsule 0    FLUoxetine (PROZAC) 20 MG capsule Take 1 capsule (20 mg) by mouth daily 90 capsule 1    levonorgestrel (MIRENA, 52 MG,) 20 MCG/24HR IUD 52 mg      losartan (COZAAR) 25 MG tablet Take 1 tablet (25 mg) by mouth daily 90 tablet 1       PAST SURGICAL HISTORY:  No past surgical history on file.    ALLERGIES  Methylphenidate derivatives    FAMILY HX:  No family history on file.    SOCIAL HX:  Social History     Socioeconomic History    Marital status: Single     Spouse name: None    Number of children: None    Years of education: None    Highest education level: None   Tobacco Use    Smoking status: Never    Smokeless tobacco: Never   Vaping Use    Vaping Use: Never used       ROS:  Constitutional: No fever, chills, or sweats. No weight gain/loss.   ENT: No visual disturbance, ear ache, epistaxis, sore throat.   Allergies/Immunologic: Negative.   Respiratory: No cough, hemoptysis.   Cardiovascular: As per HPI.   GI: No nausea, vomiting, hematemesis, melena, or hematochezia.   : No urinary frequency, dysuria, or hematuria.   Integument: Negative.   Psychiatric: Negative.   Neuro: Negative.   Endocrinology: Negative.   Musculoskeletal: No myalgia.    VITAL SIGNS:  /83 (BP Location: Right arm, Patient Position: Chair, Cuff Size: Adult Regular)   Pulse 86   Ht 1.58 m (5' 2.21\")   Wt 79.5 kg (175 lb 4.8 oz)   SpO2 98%   BMI 31.85 kg/m    Body mass index is 31.85 kg/m .  Wt Readings from Last 2 Encounters:   08/08/23 79.5 kg (175 lb 4.8 oz)   05/01/23 78.5 kg (173 lb)       PHYSICAL EXAM  Gill Rich IS A 23 year old female.in no acute distress.  HEENT: Unremarkable.  Neck: JVP normal.  Lungs: CTA.  Cor: RRR. Normal S1 and S2.  No " murmur.  Abd: Soft  Extremities: No C/C/E.   Neuro: Grossly intact.    LABS    Lab Results   Component Value Date    WBC 5.0 07/27/2018     Lab Results   Component Value Date    RBC 4.26 07/27/2018     Lab Results   Component Value Date    HGB 12.6 07/27/2018     Lab Results   Component Value Date    HCT 38.0 07/27/2018     No components found for: MCT  Lab Results   Component Value Date    MCV 89 07/27/2018     Lab Results   Component Value Date    MCH 29.6 07/27/2018     Lab Results   Component Value Date    MCHC 33.2 07/27/2018     Lab Results   Component Value Date    RDW 11.9 07/27/2018     Lab Results   Component Value Date     07/27/2018      Recent Labs   Lab Test 05/01/23  1546 02/22/22  1134    140   POTASSIUM 4.1 4.5   CHLORIDE 107 108   CO2 25 28   ANIONGAP 9 4   GLC 85 98   BUN 11 12   CR 0.65 0.70   FRAN 9.6 9.7     Recent Labs   Lab Test 05/01/23  1546 01/03/20  1402   CHOL 189 235*   HDL 78 87   * 136*   TRIG 36 61   NHDL 111 148*        IMPRESSION, REPORT, PLAN:   1.  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   2.  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  Ascending aorta 2.8 CM -MRA  6/2023 - stable  3.  BMI 31 kg/m2 - improved   4.  Depression/Anxiety -improved  5.  ADHD - on treatment         DISCUSSION:  It was a pleasure being involved in the care of Ms. Rich. She is doing really well!   She is getting therapy and her aortic dimensions are stable.  She is taking care of herself at this time, which I am so glad to see.     Discussed follow up in a year with an echo. She will let us know if there are any issues prior.      All questions were answered.  It was a pleasure to see her.  Please do not hesitate to contact me with any questions or concerns.         MARGARITA MARTINEZ MD    40 minutes face-face, documentation and review of records on day of visit      Please do not hesitate to contact me if you have any questions/concerns.     Sincerely,      Valentina Hicks MD

## 2023-08-08 NOTE — NURSING NOTE
Chief Complaint   Patient presents with    Follow Up     23 year old female with history of Marfan's (carrier of FBN1 mutation with multiple family members also carriers). presenting for follow up.         Vitals were taken, medications reconciled.    Paige Thurner, Facilitator   10:53 AM

## 2023-08-08 NOTE — PATIENT INSTRUCTIONS
You were seen today in the Adult Congenital and Cardiovascular Genetics Clinic at the [facility].    Cardiology Providers you saw during your visit:  RASTA Hciks MD    Diagnosis:  Marfans    Results:  RASTA Hicks MD reviewed the results of your EKG and chest MRA testing today in clinic.    Recommendations for you:    No changes today      General Cardiac Recommendations:  Continue to eat a heart healthy, low salt diet.  Continue to get 20-30 minutes of aerobic activity, 4-5 days per week.  Examples of aerobic activity include walking, running, swimming, cycling, etc.  Continue to observe good oral hygiene, with regular dental visits.      SBE prophylaxis:   Yes____  No___X_      Exercise restrictions:   Yes__X__  No____         If yes, list restrictions:  Must be allowed to rest if fatigued or SOB      FASTING CHOLESTEROL was checked in the last 5 years YES__X_  NO___ (2023)  If no, please follow up with your primary care physician. You should have a cholesterol screening every 5 years.      Follow-up:  Follow up with Dr Hicks in 1 year with an echo prior    If you have questions or concerns please contact us at:    Emmanuelle Bennett, RN, BSN   Kanchan Thakkar (Scheduling)  Nurse Care Coordinator     Clinic   Adult Congenital and CV Genetics   Adult Congenital and CV Genetic  [facility]   [facility]  (P) [phone]     (P) [phone]            (F) [phone]        For after hours urgent needs, call [phone] and ask to speak to the Adult Congenital Physician on call.  Mention Job Code 0401.    For emergencies call 911.    [facility]  [facility]   Clin[facility] and [facility] Center  Mail Code [id number]  [address] [place]  [address]

## 2023-08-08 NOTE — PROGRESS NOTES
CARDIOLOGY CONSULTATION:       Ms. Rich is a delightful 23-year-old woman who is known to me.   She has a history of FBN1 mutation.  We also follow some of her siblings as well.  She had been on losartan 25 mg daily.     Gill was fairly active with sports in high school but has not been as active. She does have confirmed family members with FBN1 mutations including her sisters, Kerline and Jenae, and her brother, Cory, her father and 2 paternal uncles.  Her mom has multiple sclerosis.      MRA of the aorta was last done 1/6/2020 and the sinuses measured 2.7 and ascending 2.4 CM. We repeated an MRA 4/2022 and her sinuses are 3.0 and ascending aorta 2.7 CM.  MRA 6/30/23 her sinuses measured 2.8 CM and ascending aorta 3.1 CM, so stable.   .       When I saw Gill in March 2022 she was going through some life changes and depression; she had gained some weight and was moving home.  She had some untreated anxiety as well.  We started Prozac 20mg daily. She is seeing a therapist. She started working at [employer] now and went back on ADHD meds, broke up with her toxic boyfriend and lost 60 lbs. She also got a puppy, Dalila, that is now a year. 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       PAST MEDICAL HISTORY:  No past medical history on file.    CURRENT MEDICATIONS:  Current Outpatient Medications   Medication Sig Dispense Refill    [START ON 8/17/2023] amphetamine-dextroamphetamine (ADDERALL XR) 20 MG 24 hr capsule Take 1 capsule (20 mg) by mouth daily for 30 days 30 capsule 0    [START ON 10/16/2023] amphetamine-dextroamphetamine (ADDERALL XR) 20 MG 24 hr capsule Take 1 capsule (20 mg) by mouth daily for 30 days 30 capsule 0    [START ON 9/16/2023] amphetamine-dextroamphetamine (ADDERALL XR) 20 MG 24 hr capsule Take 1 capsule (20 mg) by mouth daily for 30 days 30 capsule 0    FLUoxetine (PROZAC) 20 MG capsule Take 1 capsule (20 mg) by  "mouth daily 90 capsule 1    levonorgestrel (MIRENA, 52 MG,) 20 MCG/24HR IUD 52 mg      losartan (COZAAR) 25 MG tablet Take 1 tablet (25 mg) by mouth daily 90 tablet 1       PAST SURGICAL HISTORY:  No past surgical history on file.    ALLERGIES  Methylphenidate derivatives    FAMILY HX:  No family history on file.    SOCIAL HX:  Social History     Socioeconomic History    Marital status: Single     Spouse name: None    Number of children: None    Years of education: None    Highest education level: None   Tobacco Use    Smoking status: Never    Smokeless tobacco: Never   Vaping Use    Vaping Use: Never used       ROS:  Constitutional: No fever, chills, or sweats. No weight gain/loss.   ENT: No visual disturbance, ear ache, epistaxis, sore throat.   Allergies/Immunologic: Negative.   Respiratory: No cough, hemoptysis.   Cardiovascular: As per HPI.   GI: No nausea, vomiting, hematemesis, melena, or hematochezia.   : No urinary frequency, dysuria, or hematuria.   Integument: Negative.   Psychiatric: Negative.   Neuro: Negative.   Endocrinology: Negative.   Musculoskeletal: No myalgia.    VITAL SIGNS:  /83 (BP Location: Right arm, Patient Position: Chair, Cuff Size: Adult Regular)   Pulse 86   Ht 1.58 m (5' 2.21\")   Wt 79.5 kg (175 lb 4.8 oz)   SpO2 98%   BMI 31.85 kg/m    Body mass index is 31.85 kg/m .  Wt Readings from Last 2 Encounters:   08/08/23 79.5 kg (175 lb 4.8 oz)   05/01/23 78.5 kg (173 lb)       PHYSICAL EXAM  Gill Rich IS A 23 year old female.in no acute distress.  HEENT: Unremarkable.  Neck: JVP normal.  Lungs: CTA.  Cor: RRR. Normal S1 and S2.  No murmur.  Abd: Soft  Extremities: No C/C/E.   Neuro: Grossly intact.    LABS    Lab Results   Component Value Date    WBC 5.0 07/27/2018     Lab Results   Component Value Date    RBC 4.26 07/27/2018     Lab Results   Component Value Date    HGB 12.6 07/27/2018     Lab Results   Component Value Date    HCT 38.0 07/27/2018     No components found " for: MCT  Lab Results   Component Value Date    MCV 89 07/27/2018     Lab Results   Component Value Date    MCH 29.6 07/27/2018     Lab Results   Component Value Date    MCHC 33.2 07/27/2018     Lab Results   Component Value Date    RDW 11.9 07/27/2018     Lab Results   Component Value Date     07/27/2018      Recent Labs   Lab Test 05/01/23  1546 02/22/22  1134    140   POTASSIUM 4.1 4.5   CHLORIDE 107 108   CO2 25 28   ANIONGAP 9 4   GLC 85 98   BUN 11 12   CR 0.65 0.70   FRAN 9.6 9.7     Recent Labs   Lab Test 05/01/23  1546 01/03/20  1402   CHOL 189 235*   HDL 78 87   * 136*   TRIG 36 61   NHDL 111 148*        IMPRESSION, REPORT, PLAN:   1.  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   2.  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  Ascending aorta 2.8 CM -MRA  6/2023 - stable  3.  BMI 31 kg/m2 - improved   4.  Depression/Anxiety -improved  5.  ADHD - on treatment         DISCUSSION:  It was a pleasure being involved in the care of Ms. Rich. She is doing really well!   She is getting therapy and her aortic dimensions are stable.  She is taking care of herself at this time, which I am so glad to see.     Discussed follow up in a year with an echo. She will let us know if there are any issues prior.      All questions were answered.  It was a pleasure to see her.  Please do not hesitate to contact me with any questions or concerns.         MARGARITA MARTINEZ MD    40 minutes face-face, documentation and review of records on day of visit

## 2023-08-11 ENCOUNTER — VIRTUAL VISIT (OUTPATIENT)
Dept: PSYCHOLOGY | Facility: CLINIC | Age: 23
End: 2023-08-11
Payer: COMMERCIAL

## 2023-08-11 DIAGNOSIS — F33.9 MAJOR DEPRESSIVE DISORDER, RECURRENT EPISODE WITH ANXIOUS DISTRESS (H): Primary | ICD-10-CM

## 2023-08-11 LAB
ATRIAL RATE - MUSE: 75 BPM
DIASTOLIC BLOOD PRESSURE - MUSE: NORMAL MMHG
INTERPRETATION ECG - MUSE: NORMAL
P AXIS - MUSE: 28 DEGREES
PR INTERVAL - MUSE: 146 MS
QRS DURATION - MUSE: 80 MS
QT - MUSE: 350 MS
QTC - MUSE: 390 MS
R AXIS - MUSE: 38 DEGREES
SYSTOLIC BLOOD PRESSURE - MUSE: NORMAL MMHG
T AXIS - MUSE: 25 DEGREES
VENTRICULAR RATE- MUSE: 75 BPM

## 2023-08-11 PROCEDURE — 90834 PSYTX W PT 45 MINUTES: CPT | Mod: VID

## 2023-08-11 NOTE — PROGRESS NOTES
[facility]                                     Progress Note    Patient Name: Gill Rich  Date: 8/11/23           Service Type: Individual      Session Start Time: 2:30 pm Session End Time: 3:15 pm     Session Length: 45 minutes    Session #: 2    Attendees: Client attended alone    Service Modality:  Video Visit:      Provider verified identity through the following two step process.  Patient provided:  Patient is known previously to provider    Telemedicine Visit: The patient's condition can be safely assessed and treated via synchronous audio and visual telemedicine encounter.      Reason for Telemedicine Visit: Patient has requested telehealth visit    Originating Site (Patient Location): Patient's home    Distant Site (Provider Location): Provider Remote Setting- Home Office    Consent:  The patient/guardian has verbally consented to: the potential risks and benefits of telemedicine (video visit) versus in person care; bill my insurance or make self-payment for services provided; and responsibility for payment of non-covered services.     Patient would like the video invitation sent by:  My Chart    Mode of Communication:  Video Conference via Amwell    Distant Location (Provider):  Off-site    As the provider I attest to compliance with applicable laws and regulations related to telemedicine.    DATA  Interactive Complexity: No  Crisis: No        Progress Since Last Session (Related to Symptoms / Goals / Homework):   Symptoms: Improving - symptoms have improved since last session.    Homework: Achieved / completed to satisfaction      Episode of Care Goals: Minimal progress - PREPARATION (Decided to change - considering how); Intervened by negotiating a change plan and determining options / strategies for behavior change, identifying triggers, exploring social supports, and working towards setting a date to begin behavior change     Current / Ongoing Stressors and Concerns:   Patient  reports history of depression, anxiety, ASD, and ADHD. Patient was diagnosed with ASD and ADHD in elementary school. Patient is experiencing anxiety and is seeking counseling for support.     Treatment Objective(s) Addressed in This Session:   Identify triggers for depression and anxiety symptoms.     Intervention:   Patient explored the last week at work and expressed it went well. 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 Effective coping skills were discussed - staying in the moment, using positive self-talk. Patient also explored her previous romantic relationship and wanting to end the friendship, because it is increasing her symptoms. Writer used encouragement, validation, and reflective listening skills.     Assessments completed prior to visit:  The following assessments were completed by patient for this visit:  PROMIS-10 Scores  Global Mental Health Score: 15  Global Physical Health Score: 15  PROMIS TOTAL - SUBSCORES: 30      ASSESSMENT: Current Emotional / Mental Status (status of significant symptoms):   Risk status (Self / Other harm or suicidal ideation)   Patient denies current fears or concerns for personal safety.   Patient denies current or recent suicidal ideation or behaviors.   Patient denies current or recent homicidal ideation or behaviors.   Patient denies current or recent self injurious behavior or ideation.   Patient denies other safety concerns.   Patient reports there has been no change in risk factors since their last session.     Patient reports there has been no change in protective factors since their last session.     Recommended that patient call 911 or go to the local ED should there be a change in any of these risk factors.     Appearance:   Appropriate    Eye Contact:   Good    Psychomotor Behavior: Normal    Attitude:   Cooperative    Orientation:   All   Speech    Rate / Production: Normal     Volume:  Normal    Mood:    Sad, anxious     Affect:    Appropriate    Thought Content:  Clear    Thought Form:  Coherent  Logical    Insight:    Good      Medication Review:   No current psychiatric medications prescribed     Medication Compliance:   Yes     Changes in Health Issues:   Patient reports she followed up with her provider regarding the enlarged aorta, and everything is currently going well.     Chemical Use Review:   Substance Use: Chemical use reviewed, no active concerns identified      Tobacco Use: No current tobacco use.      Diagnosis:  1. Major depressive disorder, recurrent episode with anxious distress (H)        Collateral Reports Completed:   Not Applicable    PLAN: (Patient Tasks / Therapist Tasks / Other)  Patient will practice self-care and increase social activities with peers.        Kristi Hicks MA, [facility]  8/13/23  Note reviewed and clinical supervision by LINDA Rudolph, [facility] 8/14/2023

## 2023-08-15 ENCOUNTER — MYC MEDICAL ADVICE (OUTPATIENT)
Dept: CARDIOLOGY | Facility: CLINIC | Age: 23
End: 2023-08-15
Payer: COMMERCIAL

## 2023-09-01 ENCOUNTER — FCC EXTENDED DOCUMENTATION (OUTPATIENT)
Dept: PSYCHOLOGY | Facility: CLINIC | Age: 23
End: 2023-09-01
Payer: COMMERCIAL

## 2023-09-01 NOTE — PROGRESS NOTES
Referral/Transfer of an [facility] Client to Another [facility] Therapist    CLIENT'S NAME: Gill Rich  DATE of Referral/Transfer Request:  September 1, 2023    Referral/Transfer Request Information    Transfer for the same service: Therapist Initiated    Client Phone #:  [phone] (home)        Telephone Information:   Mobile [phone]         Facilitating Referral/Transfer: intake dept.     Current Therapist: Kristi Hicks      Therapist Receiving Referral/Transfer: Masha Rodriguez    Referral/Transfer is for: Individual    Rationale for Referral/Transfer: (to be completed by [facility] staff person initiating the referral)  Situation: (What is going on with the client?)  Patient has ASD, ADHD, MDD with anxious distress. 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, and is experiencing work, social stressors.       Background: (What is the clinical background or context?)  Patient has a supportive family, she lives with her parents.      Assessment: (What do you think the problem is?)   Patient has negative beliefs that trigger symptoms.      Recommendation: (What would you do to correct it?)  Continue with CBT and increasing coping skills. Using MI is helpful.      Referral/Transfer Status:    Intake to call patient to schedule with Masha Rodriguez. Patient can also call the counseling center to schedule with Masha Rodriguez, in a return slot.      Kristi Hicks MA, [facility] September 1, 2023

## 2023-12-12 ENCOUNTER — VIRTUAL VISIT (OUTPATIENT)
Dept: FAMILY MEDICINE | Facility: CLINIC | Age: 23
End: 2023-12-12
Payer: COMMERCIAL

## 2023-12-12 DIAGNOSIS — F90.0 ATTENTION-DEFICIT HYPERACTIVITY DISORDER, PREDOMINANTLY INATTENTIVE TYPE: ICD-10-CM

## 2023-12-12 PROCEDURE — 99213 OFFICE O/P EST LOW 20 MIN: CPT | Mod: 95

## 2023-12-12 RX ORDER — DEXTROAMPHETAMINE SACCHARATE, AMPHETAMINE ASPARTATE MONOHYDRATE, DEXTROAMPHETAMINE SULFATE AND AMPHETAMINE SULFATE 5; 5; 5; 5 MG/1; MG/1; MG/1; MG/1
20 CAPSULE, EXTENDED RELEASE ORAL DAILY
Qty: 30 CAPSULE | Refills: 0 | Status: SHIPPED | OUTPATIENT
Start: 2023-12-12 | End: 2024-08-26

## 2023-12-12 ASSESSMENT — PATIENT HEALTH QUESTIONNAIRE - PHQ9: SUM OF ALL RESPONSES TO PHQ QUESTIONS 1-9: 3

## 2023-12-12 NOTE — PROGRESS NOTES
"Gill is a 23 year old who is being evaluated via a billable video visit.      How would you like to obtain your AVS? MyChart  If the video visit is dropped, the invitation should be resent by: Text to cell phone: [phone]  Will anyone else be joining your video visit? No      Has a history of ADHD and has been on Adderall for about 1-1.5 years. Tolerating current dose she is taking. No side effects. No SI.     Currently is experiencing COVID with with some congestion but is otherwise doing okay.  Has had these symptoms for about 10 days but recently just tested positive. No other complaints.     Is in the process of trying to establish care with a PCP, as many different people have been managing her medications.    Assessment & Plan     Attention-deficit hyperactivity disorder, predominantly inattentive type  -Stable, states her current dose of Adderall is effective and is without side effects  -Will provide 1 month refill  - amphetamine-dextroamphetamine (ADDERALL XR) 20 MG 24 hr capsule  Dispense: 30 capsule; Refill: 0      Review of external notes as documented elsewhere in note  Prescription drug management  15 minutes spent by me on the date of the encounter doing chart review, history and exam, documentation and further activities per the note    BMI:   Estimated body mass index is 31.85 kg/m  as calculated from the following:    Height as of 8/8/23: 1.58 m (5' 2.21\").    Weight as of 8/8/23: 79.5 kg (175 lb 4.8 oz).         Kerline Lake [facility]    Subjective   Gill is a 23 year old, presenting for the following health issues:  A.D.H.D (Follow up/) and Health Maintenance (Never have Pap Smear)      History of Present Illness       Reason for visit:  ADHD FU, Medication refill    She eats 0-1 servings of fruits and vegetables daily.She consumes 2 sweetened beverage(s) daily.She exercises with enough effort to increase her heart rate 20 to 29 minutes per day.  She exercises with " enough effort to increase her heart rate 3 or less days per week. She is missing 1 dose(s) of medications per week.  She is not taking prescribed medications regularly due to other.               Review of Systems   Constitutional, HEENT, cardiovascular, pulmonary, gi and gu systems are negative, except as otherwise noted.      Objective         Pleasant 23-year-old female with a history of ADHD is requesting a refill for her Adderall.  Stating that is working well to manage her symptoms and is not experiencing any side effects.     Vitals:  No vitals were obtained today due to virtual visit.    Physical Exam   GENERAL: Healthy, alert and no distress  EYES: Eyes grossly normal to inspection.  No discharge or erythema, or obvious scleral/conjunctival abnormalities.  RESP: No audible wheeze, cough, or visible cyanosis.  No visible retractions or increased work of breathing.    SKIN: Visible skin clear. No significant rash, abnormal pigmentation or lesions.  NEURO: Cranial nerves grossly intact.  Mentation and speech appropriate for age.  PSYCH: Mentation appears normal, affect normal/bright, judgement and insight intact, normal speech and appearance well-groomed.                Video-Visit Details    Type of service:  Video Visit     Originating Location (pt. Location): Home    Distant Location (provider location):  On-site  Platform used for Video Visit: Mark        *Dr. Irene Trotter MD signed order for Adderall as writer did not have privileges to sign

## 2024-02-02 ENCOUNTER — VIRTUAL VISIT (OUTPATIENT)
Dept: FAMILY MEDICINE | Facility: CLINIC | Age: 24
End: 2024-02-02
Payer: COMMERCIAL

## 2024-02-02 DIAGNOSIS — F90.0 ATTENTION-DEFICIT HYPERACTIVITY DISORDER, PREDOMINANTLY INATTENTIVE TYPE: Primary | ICD-10-CM

## 2024-02-02 PROCEDURE — 99213 OFFICE O/P EST LOW 20 MIN: CPT | Mod: 95

## 2024-02-02 RX ORDER — DEXTROAMPHETAMINE SACCHARATE, AMPHETAMINE ASPARTATE MONOHYDRATE, DEXTROAMPHETAMINE SULFATE AND AMPHETAMINE SULFATE 5; 5; 5; 5 MG/1; MG/1; MG/1; MG/1
20 CAPSULE, EXTENDED RELEASE ORAL DAILY
Qty: 30 CAPSULE | Refills: 0 | Status: SHIPPED | OUTPATIENT
Start: 2024-04-04 | End: 2024-05-04

## 2024-02-02 RX ORDER — DEXTROAMPHETAMINE SACCHARATE, AMPHETAMINE ASPARTATE MONOHYDRATE, DEXTROAMPHETAMINE SULFATE AND AMPHETAMINE SULFATE 5; 5; 5; 5 MG/1; MG/1; MG/1; MG/1
20 CAPSULE, EXTENDED RELEASE ORAL DAILY
Qty: 30 CAPSULE | Refills: 0 | Status: SHIPPED | OUTPATIENT
Start: 2024-02-02 | End: 2024-03-03

## 2024-02-02 RX ORDER — DEXTROAMPHETAMINE SACCHARATE, AMPHETAMINE ASPARTATE MONOHYDRATE, DEXTROAMPHETAMINE SULFATE AND AMPHETAMINE SULFATE 5; 5; 5; 5 MG/1; MG/1; MG/1; MG/1
20 CAPSULE, EXTENDED RELEASE ORAL DAILY
Qty: 30 CAPSULE | Refills: 0 | Status: SHIPPED | OUTPATIENT
Start: 2024-03-04 | End: 2024-04-03

## 2024-02-02 NOTE — PROGRESS NOTES
"Gill is a 23 year old who is being evaluated via a billable video visit.      How would you like to obtain your AVS? MyChart  If the video visit is dropped, the invitation should be resent by: Text to cell phone: [phone]  Will anyone else be joining your video visit? No          Assessment & Plan     Attention-deficit hyperactivity disorder, predominantly inattentive type  -Current dose of Adderall has been effective at managing symptoms, will provide refills.  Next follow-up visit in 6 months or for annual.  - REVIEW OF HEALTH MAINTENANCE PROTOCOL ORDERS  - amphetamine-dextroamphetamine (ADDERALL XR) 20 MG 24 hr capsule  Dispense: 30 capsule; Refill: 0  - amphetamine-dextroamphetamine (ADDERALL XR) 20 MG 24 hr capsule  Dispense: 30 capsule; Refill: 0  - amphetamine-dextroamphetamine (ADDERALL XR) 20 MG 24 hr capsule  Dispense: 30 capsule; Refill: 0              BMI  Estimated body mass index is 31.85 kg/m  as calculated from the following:    Height as of 8/8/23: 1.58 m (5' 2.21\").    Weight as of 8/8/23: 79.5 kg (175 lb 4.8 oz).         FUTURE APPOINTMENTS:       - Follow-up office or E-visit in 6 months       - Follow-up for annual visit or as needed    Subjective   Gill is a 23 year old, presenting for the following health issues:  Refill Request    Here for med refill, no acute concerns or questions regarding medication.  Has fully recovered from her COVID infection from our last visit.    History of Present Illness       Reason for visit:  Medication refil    She eats 0-1 servings of fruits and vegetables daily.She consumes 2 sweetened beverage(s) daily.She exercises with enough effort to increase her heart rate 10 to 19 minutes per day.  She exercises with enough effort to increase her heart rate 3 or less days per week. She is missing 2 dose(s) of medications per week.  She is not taking prescribed medications regularly due to remembering to take and other.               Review of " Systems  Constitutional, HEENT, cardiovascular, pulmonary, gi and gu systems are negative, except as otherwise noted.      Objective           Vitals:  No vitals were obtained today due to virtual visit.    Physical Exam   GENERAL: alert and no distress  EYES: Eyes grossly normal to inspection.  No discharge or erythema, or obvious scleral/conjunctival abnormalities.  RESP: No audible wheeze, cough, or visible cyanosis.    SKIN: Visible skin clear. No significant rash, abnormal pigmentation or lesions.  NEURO: Cranial nerves grossly intact.  Mentation and speech appropriate for age.  PSYCH: Appropriate affect, tone, and pace of words          Video-Visit Details    Type of service:  Video Visit     Originating Location (pt. Location): Home    Distant Location (provider location):  On-site  Platform used for Video Visit: Mark  Signed Electronically by: Kerline Lake, ZCAH, APRN, CNP

## 2024-02-11 ENCOUNTER — TELEPHONE (OUTPATIENT)
Dept: NURSING | Facility: CLINIC | Age: 24
End: 2024-02-11
Payer: COMMERCIAL

## 2024-02-11 DIAGNOSIS — F90.0 ATTENTION-DEFICIT HYPERACTIVITY DISORDER, PREDOMINANTLY INATTENTIVE TYPE: ICD-10-CM

## 2024-02-11 NOTE — TELEPHONE ENCOUNTER
Patient calling requesting pharmacy change on Adderall. Stating her current pharmacy has been out of stock.    [facility]    Last prescribed 2/2/24. Stating she is currently out of medication.    Toya Mendoza RN  [facility]

## 2024-02-12 RX ORDER — DEXTROAMPHETAMINE SACCHARATE, AMPHETAMINE ASPARTATE MONOHYDRATE, DEXTROAMPHETAMINE SULFATE AND AMPHETAMINE SULFATE 5; 5; 5; 5 MG/1; MG/1; MG/1; MG/1
20 CAPSULE, EXTENDED RELEASE ORAL DAILY
Qty: 30 CAPSULE | Refills: 0 | Status: SHIPPED | OUTPATIENT
Start: 2024-02-12 | End: 2024-03-13

## 2024-02-12 RX ORDER — DEXTROAMPHETAMINE SACCHARATE, AMPHETAMINE ASPARTATE MONOHYDRATE, DEXTROAMPHETAMINE SULFATE AND AMPHETAMINE SULFATE 5; 5; 5; 5 MG/1; MG/1; MG/1; MG/1
20 CAPSULE, EXTENDED RELEASE ORAL DAILY
Qty: 30 CAPSULE | Refills: 0 | Status: SHIPPED | OUTPATIENT
Start: 2024-04-14 | End: 2024-05-14

## 2024-02-12 RX ORDER — DEXTROAMPHETAMINE SACCHARATE, AMPHETAMINE ASPARTATE MONOHYDRATE, DEXTROAMPHETAMINE SULFATE AND AMPHETAMINE SULFATE 5; 5; 5; 5 MG/1; MG/1; MG/1; MG/1
20 CAPSULE, EXTENDED RELEASE ORAL DAILY
Qty: 30 CAPSULE | Refills: 0 | Status: CANCELLED | OUTPATIENT
Start: 2024-02-12

## 2024-02-12 RX ORDER — DEXTROAMPHETAMINE SACCHARATE, AMPHETAMINE ASPARTATE MONOHYDRATE, DEXTROAMPHETAMINE SULFATE AND AMPHETAMINE SULFATE 5; 5; 5; 5 MG/1; MG/1; MG/1; MG/1
20 CAPSULE, EXTENDED RELEASE ORAL DAILY
Qty: 30 CAPSULE | Refills: 0 | Status: SHIPPED | OUTPATIENT
Start: 2024-03-14 | End: 2024-04-13

## 2024-02-12 NOTE — TELEPHONE ENCOUNTER
TO PCP    Pharmacy and medication pended for your review.    DASH SHETTY RN on 2/12/2024 at 2:40 PM

## 2024-02-12 NOTE — TELEPHONE ENCOUNTER
This needed to be sent to provider. Will route to appropriate location.    DASH SHETTY RN on 2/12/2024 at 2:39 PM

## 2024-02-12 NOTE — TELEPHONE ENCOUNTER
TO PCP    Attempted to call original pharmacy to verify out of stock, but on hold too long.    Pharmacy and medication pended for your review.     DASH SHETTY RN on 2/12/2024 at 2:42 PM

## 2024-04-23 DIAGNOSIS — F32.9 CURRENT EPISODE OF MAJOR DEPRESSIVE DISORDER WITHOUT PRIOR EPISODE, UNSPECIFIED DEPRESSION EPISODE SEVERITY: ICD-10-CM

## 2024-04-23 DIAGNOSIS — Q87.40 MARFAN SYNDROME: ICD-10-CM

## 2024-04-23 RX ORDER — LOSARTAN POTASSIUM 25 MG/1
25 TABLET ORAL DAILY
Qty: 90 TABLET | Refills: 3 | Status: SHIPPED | OUTPATIENT
Start: 2024-04-23

## 2024-06-01 LAB
CHLAMYDIA - HIM PATIENT REPORTED: NORMAL
PAP SMEAR - HIM PATIENT REPORTED: NORMAL

## 2024-06-20 ENCOUNTER — TRANSFERRED RECORDS (OUTPATIENT)
Dept: MULTI SPECIALTY CLINIC | Facility: CLINIC | Age: 24
End: 2024-06-20

## 2024-07-14 ENCOUNTER — HEALTH MAINTENANCE LETTER (OUTPATIENT)
Age: 24
End: 2024-07-14

## 2024-08-08 NOTE — PROGRESS NOTES
CARDIOLOGY CONSULTATION:    Please see dictated note    PAST MEDICAL HISTORY:  No past medical history on file.    CURRENT MEDICATIONS:  Current Outpatient Medications   Medication Sig Dispense Refill     losartan (COZAAR) 25 MG tablet Take 1 tablet (25 mg) by mouth daily 90 tablet 3     methylphenidate (CONCERTA) 36 MG CR tablet Take 36 mg by mouth daily       methylphenidate (RITALIN) 20 MG tablet 20 mg daily  0       PAST SURGICAL HISTORY:  No past surgical history on file.    ALLERGIES  Patient has no known allergies.    FAMILY HX:  No family history on file.    SOCIAL HX:  Social History     Socioeconomic History     Marital status: Single     Spouse name: None     Number of children: None     Years of education: None     Highest education level: None   Occupational History     None   Social Needs     Financial resource strain: None     Food insecurity:     Worry: None     Inability: None     Transportation needs:     Medical: None     Non-medical: None   Tobacco Use     Smoking status: Never Smoker     Smokeless tobacco: Never Used   Substance and Sexual Activity     Alcohol use: None     Drug use: None     Sexual activity: None   Lifestyle     Physical activity:     Days per week: None     Minutes per session: None     Stress: None   Relationships     Social connections:     Talks on phone: None     Gets together: None     Attends Cheondoism service: None     Active member of club or organization: None     Attends meetings of clubs or organizations: None     Relationship status: None     Intimate partner violence:     Fear of current or ex partner: None     Emotionally abused: None     Physically abused: None     Forced sexual activity: None   Other Topics Concern     None   Social History Narrative     None       ROS:  Constitutional: No fever, chills, or sweats. No weight gain/loss.   ENT: No visual disturbance, ear ache, epistaxis, sore throat.   Allergies/Immunologic: Negative.   Respiratory: No cough,  "hemoptysis.   Cardiovascular: As per HPI.   GI: No nausea, vomiting, hematemesis, melena, or hematochezia.   : No urinary frequency, dysuria, or hematuria.   Integument: Negative.   Psychiatric: Negative.   Neuro: Negative.   Endocrinology: Negative.   Musculoskeletal: No myalgia.    VITAL SIGNS:  /79 (BP Location: Left arm, Patient Position: Chair, Cuff Size: Adult Regular)   Pulse 63   Ht 1.6 m (5' 3\")   Wt 84.9 kg (187 lb 3.2 oz)   SpO2 97%   BMI 33.16 kg/m    Body mass index is 33.16 kg/m .  Wt Readings from Last 2 Encounters:   01/03/20 84.9 kg (187 lb 3.2 oz) (96 %)*   07/27/18 70.3 kg (155 lb) (88 %)*     * Growth percentiles are based on [facility] (Girls, 2-20 Years) data.       PHYSICAL EXAM  Gill Rich IS A 19 year old female.in no acute distress.  HEENT: Unremarkable.  Neck: JVP normal.  Carotids +4/4 bilaterally without bruits.  Lungs: CTA.  Cor: RRR. Normal S1 and S2.  No murmur, rub, or gallop.  PMI in Lf 5th ICS.  Abd: Soft, nontender, nondistended.  NABS.  No pulsatile mass.  Extremities: No C/C/E.  Pulses +4/4 symmetric in upper and lower extremities.  Neuro: Grossly intact.    LABS    Lab Results   Component Value Date    WBC 5.0 07/27/2018     Lab Results   Component Value Date    RBC 4.26 07/27/2018     Lab Results   Component Value Date    HGB 12.6 07/27/2018     Lab Results   Component Value Date    HCT 38.0 07/27/2018     No components found for: MCT  Lab Results   Component Value Date    MCV 89 07/27/2018     Lab Results   Component Value Date    MCH 29.6 07/27/2018     Lab Results   Component Value Date    MCHC 33.2 07/27/2018     Lab Results   Component Value Date    RDW 11.9 07/27/2018     Lab Results   Component Value Date     07/27/2018      Recent Labs   Lab Test 07/27/18  1330      POTASSIUM 4.6   CHLORIDE 104   CO2 27   ANIONGAP 6   GLC 84   BUN 12   CR 0.70   FRAN 9.3     Recent Labs   Lab Test 07/27/18  1330   CHOL 205*   HDL 79   *   TRIG 66   NHDL 126* "        RASTA Hicks MD   n/a

## 2024-08-13 ENCOUNTER — MYC MEDICAL ADVICE (OUTPATIENT)
Dept: FAMILY MEDICINE | Facility: CLINIC | Age: 24
End: 2024-08-13
Payer: COMMERCIAL

## 2024-08-14 NOTE — TELEPHONE ENCOUNTER
Patient Quality Outreach    Patient is due for the following:   Cervical Cancer Screening - PAP Needed    Next Steps:   Schedule a office visit for PAP EXAM    Type of outreach:    Sent [employer] message.8/13/2024      Questions for provider review:    None           Bret Sharma CMA

## 2024-08-26 ENCOUNTER — VIRTUAL VISIT (OUTPATIENT)
Dept: FAMILY MEDICINE | Facility: CLINIC | Age: 24
End: 2024-08-26
Payer: COMMERCIAL

## 2024-08-26 DIAGNOSIS — F90.0 ATTENTION-DEFICIT HYPERACTIVITY DISORDER, PREDOMINANTLY INATTENTIVE TYPE: ICD-10-CM

## 2024-08-26 PROCEDURE — 99213 OFFICE O/P EST LOW 20 MIN: CPT | Mod: 95

## 2024-08-26 RX ORDER — DEXTROAMPHETAMINE SACCHARATE, AMPHETAMINE ASPARTATE MONOHYDRATE, DEXTROAMPHETAMINE SULFATE AND AMPHETAMINE SULFATE 5; 5; 5; 5 MG/1; MG/1; MG/1; MG/1
20 CAPSULE, EXTENDED RELEASE ORAL DAILY
Qty: 30 CAPSULE | Refills: 0 | Status: SHIPPED | OUTPATIENT
Start: 2024-10-25 | End: 2024-11-24

## 2024-08-26 RX ORDER — DEXTROAMPHETAMINE SACCHARATE, AMPHETAMINE ASPARTATE MONOHYDRATE, DEXTROAMPHETAMINE SULFATE AND AMPHETAMINE SULFATE 5; 5; 5; 5 MG/1; MG/1; MG/1; MG/1
20 CAPSULE, EXTENDED RELEASE ORAL DAILY
Qty: 30 CAPSULE | Refills: 0 | Status: SHIPPED | OUTPATIENT
Start: 2024-09-25 | End: 2024-10-25

## 2024-08-26 RX ORDER — DEXTROAMPHETAMINE SACCHARATE, AMPHETAMINE ASPARTATE MONOHYDRATE, DEXTROAMPHETAMINE SULFATE AND AMPHETAMINE SULFATE 5; 5; 5; 5 MG/1; MG/1; MG/1; MG/1
20 CAPSULE, EXTENDED RELEASE ORAL DAILY
Qty: 30 CAPSULE | Refills: 0 | Status: SHIPPED | OUTPATIENT
Start: 2024-08-26 | End: 2024-09-25

## 2024-08-26 ASSESSMENT — PATIENT HEALTH QUESTIONNAIRE - PHQ9
SUM OF ALL RESPONSES TO PHQ QUESTIONS 1-9: 3
10. IF YOU CHECKED OFF ANY PROBLEMS, HOW DIFFICULT HAVE THESE PROBLEMS MADE IT FOR YOU TO DO YOUR WORK, TAKE CARE OF THINGS AT HOME, OR GET ALONG WITH OTHER PEOPLE: NOT DIFFICULT AT ALL
SUM OF ALL RESPONSES TO PHQ QUESTIONS 1-9: 3

## 2024-08-26 NOTE — PROGRESS NOTES
Gill is a 24 year old who is being evaluated via a billable video visit.    How would you like to obtain your AVS? MyChart  If the video visit is dropped, the invitation should be resent by: Text to cell phone: [phone]  Will anyone else be joining your video visit? No      Assessment & Plan     Attention-deficit hyperactivity disorder, predominantly inattentive type  Notes she has been out of medication due to pharmacy supply, would like to refill at different pharmacy as it is helpful in controlling symptoms. Will reach out to pharmacy to see if there is an alternative order and/or pharmacy that will be able to provide supply  - amphetamine-dextroamphetamine (ADDERALL XR) 20 MG 24 hr capsule; Take 1 capsule (20 mg) by mouth daily.  - amphetamine-dextroamphetamine (ADDERALL XR) 20 MG 24 hr capsule; Take 1 capsule (20 mg) by mouth daily. Do not start before September 25, 2024.  - amphetamine-dextroamphetamine (ADDERALL XR) 20 MG 24 hr capsule; Take 1 capsule (20 mg) by mouth daily. Do not start before October 25, 2024.            FUTURE APPOINTMENTS:       - Follow-up for annual visit or as needed    Subjective   Gill is a 24 year old, presenting for the following health issues:  Recheck Medication      Via the Health Maintenance questionnaire, the patient has reported the following services have been completed -Chlamydia: Obstetrics, Gynocology and Infertility 2024-06-20-Cervical Cancer Screening: Obstetrics, Gynocology, and Infertility 2024-06-20, this information has been sent to the abstraction team.  Has not been able to continue taking Adderall due to pharmacy shortage, asks about switching from capsules to tablets for ease of access.     History of Present Illness       Reason for visit:  Discuss change to medication She is missing 2 dose(s) of medications per week.  She is not taking prescribed medications regularly due to other.               Review of Systems  Constitutional, HEENT, cardiovascular,  pulmonary, gi and gu systems are negative, except as otherwise noted.      Objective           Vitals:  No vitals were obtained today due to virtual visit.    Physical Exam   GENERAL: alert and no distress  EYES: Eyes grossly normal to inspection.  No discharge or erythema, or obvious scleral/conjunctival abnormalities.  RESP: No audible wheeze, cough, or visible cyanosis.    SKIN: Visible skin clear. No significant rash, abnormal pigmentation or lesions.  NEURO: Cranial nerves grossly intact.  Mentation and speech appropriate for age.  PSYCH: Appropriate affect, tone, and pace of words          Video-Visit Details    Type of service:  Video Visit   Originating Location (pt. Location): Other Work  Distant Location (provider location):  On-site  Platform used for Video Visit: Mark  Signed Electronically by: Kerline Lake, DNP, APRN, CNP

## 2024-10-22 ENCOUNTER — ANCILLARY PROCEDURE (OUTPATIENT)
Dept: CARDIOLOGY | Facility: CLINIC | Age: 24
End: 2024-10-22
Attending: INTERNAL MEDICINE
Payer: COMMERCIAL

## 2024-10-22 ENCOUNTER — LAB (OUTPATIENT)
Dept: LAB | Facility: CLINIC | Age: 24
End: 2024-10-22
Payer: COMMERCIAL

## 2024-10-22 VITALS
BODY MASS INDEX: 34.34 KG/M2 | DIASTOLIC BLOOD PRESSURE: 86 MMHG | HEART RATE: 60 BPM | SYSTOLIC BLOOD PRESSURE: 130 MMHG | WEIGHT: 189 LBS | OXYGEN SATURATION: 100 %

## 2024-10-22 DIAGNOSIS — R53.83 OTHER FATIGUE: ICD-10-CM

## 2024-10-22 DIAGNOSIS — Q24.9 ADULT CONGENITAL HEART DISEASE: ICD-10-CM

## 2024-10-22 DIAGNOSIS — Q87.40 MARFAN'S SYNDROME: ICD-10-CM

## 2024-10-22 DIAGNOSIS — F90.9 ATTENTION DEFICIT HYPERACTIVITY DISORDER (ADHD), UNSPECIFIED ADHD TYPE: ICD-10-CM

## 2024-10-22 DIAGNOSIS — F32.A DEPRESSION, UNSPECIFIED DEPRESSION TYPE: ICD-10-CM

## 2024-10-22 DIAGNOSIS — F32.A DEPRESSION, UNSPECIFIED DEPRESSION TYPE: Primary | ICD-10-CM

## 2024-10-22 LAB
ERYTHROCYTE [DISTWIDTH] IN BLOOD BY AUTOMATED COUNT: 12.2 % (ref 10–15)
EST. AVERAGE GLUCOSE BLD GHB EST-MCNC: 97 MG/DL
HBA1C MFR BLD: 5 %
HCT VFR BLD AUTO: 38 % (ref 35–47)
HGB BLD-MCNC: 12.7 G/DL (ref 11.7–15.7)
LVEF ECHO: NORMAL
MCH RBC QN AUTO: 30.2 PG (ref 26.5–33)
MCHC RBC AUTO-ENTMCNC: 33.4 G/DL (ref 31.5–36.5)
MCV RBC AUTO: 90 FL (ref 78–100)
PLATELET # BLD AUTO: 349 10E3/UL (ref 150–450)
RBC # BLD AUTO: 4.21 10E6/UL (ref 3.8–5.2)
TSH SERPL DL<=0.005 MIU/L-ACNC: 1.42 UIU/ML (ref 0.3–4.2)
VIT D+METAB SERPL-MCNC: 14 NG/ML (ref 20–50)
WBC # BLD AUTO: 3.8 10E3/UL (ref 4–11)

## 2024-10-22 PROCEDURE — 93325 DOPPLER ECHO COLOR FLOW MAPG: CPT | Performed by: STUDENT IN AN ORGANIZED HEALTH CARE EDUCATION/TRAINING PROGRAM

## 2024-10-22 PROCEDURE — 93005 ELECTROCARDIOGRAM TRACING: CPT

## 2024-10-22 PROCEDURE — 83036 HEMOGLOBIN GLYCOSYLATED A1C: CPT | Performed by: INTERNAL MEDICINE

## 2024-10-22 PROCEDURE — 93320 DOPPLER ECHO COMPLETE: CPT | Performed by: STUDENT IN AN ORGANIZED HEALTH CARE EDUCATION/TRAINING PROGRAM

## 2024-10-22 PROCEDURE — 82306 VITAMIN D 25 HYDROXY: CPT | Performed by: INTERNAL MEDICINE

## 2024-10-22 PROCEDURE — 99211 OFF/OP EST MAY X REQ PHY/QHP: CPT | Mod: 25 | Performed by: INTERNAL MEDICINE

## 2024-10-22 PROCEDURE — 85027 COMPLETE CBC AUTOMATED: CPT | Performed by: PATHOLOGY

## 2024-10-22 PROCEDURE — 99000 SPECIMEN HANDLING OFFICE-LAB: CPT | Performed by: PATHOLOGY

## 2024-10-22 PROCEDURE — 93303 ECHO TRANSTHORACIC: CPT | Performed by: STUDENT IN AN ORGANIZED HEALTH CARE EDUCATION/TRAINING PROGRAM

## 2024-10-22 PROCEDURE — 84443 ASSAY THYROID STIM HORMONE: CPT | Performed by: PATHOLOGY

## 2024-10-22 PROCEDURE — 99215 OFFICE O/P EST HI 40 MIN: CPT | Mod: 25 | Performed by: INTERNAL MEDICINE

## 2024-10-22 PROCEDURE — 36415 COLL VENOUS BLD VENIPUNCTURE: CPT | Performed by: PATHOLOGY

## 2024-10-22 RX ORDER — LOSARTAN POTASSIUM 25 MG/1
25 TABLET ORAL DAILY
Qty: 90 TABLET | Refills: 3 | Status: SHIPPED | OUTPATIENT
Start: 2024-10-22

## 2024-10-22 RX ORDER — BUPROPION HYDROCHLORIDE 150 MG/1
150 TABLET ORAL EVERY MORNING
Qty: 90 TABLET | Refills: 3 | Status: SHIPPED | OUTPATIENT
Start: 2024-10-22

## 2024-10-22 ASSESSMENT — PAIN SCALES - GENERAL: PAINLEVEL: NO PAIN (0)

## 2024-10-22 NOTE — NURSING NOTE
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 presenting for follow up.       Vitals were taken, medications reconciled and EKG performed.    Joaquin Sepulveda, Clinic Assistant  10:40 AM

## 2024-10-22 NOTE — PATIENT INSTRUCTIONS
"Thank you for visiting the Adult Congenital and Cardiovascular Genetics Clinic at the [facility].    Cardiology Providers you saw during your visit:  RASTA Hicks MD    Diagnosis:  Stephanie    Results:  RASTA Hicks MD reviewed the results of your EKG and echocardiogram testing today in clinic.    If you have questions or concerns, please call us at [phone] or contact us through [employer].  ______________________________________________________________________________    Recommendations from your Cardiology Provider TODAY:    Complete labs, we will watch for results  Referral for therapy  START Wellbutrin 150 mg daily  CONTINUE losartan 25 mg daily      ____________________________________________________________________________________________________    Follow-up Plan:  Follow up with Dr Hicks in 1 year with an echocardiogram prior      ____________________________________________________________________________________________________    If you have questions or concerns, please call us at [phone] or contact us through [employer]. Our fax number is [phone]    Emmanuelle Bennett RN RN, BSN   Farzana Cortes (Scheduling)  Nurse Care Coordinator     Clinic   Adult Congenital and CV Genetics              Adult Congenital and CV Genetics  [facility]              [facility]        For after hours urgent needs, call [phone] and ask to speak to the \"On-Call Cardiologist.\"    For emergencies call 911.      ____________________________________________________________________________________________________    Additional Important Information for Your Heart Health      General Cardiac Recommendations:  Continue to eat a heart healthy, low salt diet.  Continue to get 20-30 minutes of aerobic activity, 4-5 days per week.  Examples of aerobic activity include walking, running, swimming, cycling, etc.  Continue to observe good " oral hygiene, with regular dental visits.        SBE prophylaxis (antibiotics needed before dental appointments):   Yes____  No__X__    SBE prophylaxis applies to patients with certain heart conditions who are recommended to take antibiotics before dental appointments and other specific procedures. These antibiotics are to help prevent an infection of the heart (endocarditis) that certain patients are at higher risk of developing. The guidelines used come from the American Heart Association and are periodically updated        FASTING CHOLESTEROL was checked in the last 5 years YES__X__  NO____ (2023)  If no, please follow up with your primary care physician. You should have a cholesterol screening every 5 years at minimum, and every year if taking a medication for your cholesterol levels.

## 2024-10-22 NOTE — NURSING NOTE
Cardiac Testing: Patient given instructions regarding  echocardiogram . Discussed purpose, preparation, procedure and when to expect results reported back to the patient. Patient demonstrated understanding of this information and agreed to call with further questions or concerns.    Labs: Patient was given results of the laboratory testing obtained today. Patient was instructed to return for the next laboratory testing today . Patient demonstrated understanding of this information and agreed to call with further questions or concerns.     Med Reconcile: Reviewed and verified all current medications with the patient. The updated medication list was printed and given to the patient.    New Medication: Patient was educated regarding newly prescribed medication, including discussion of  the indication, administration, side effects, and when to report to MD or RN. Patient demonstrated understanding of this information and agreed to call with further questions or concerns.    Return Appointment: Patient given instructions regarding scheduling next clinic visit. Patient demonstrated understanding of this information and agreed to call with further questions or concerns.    Patient stated she understood all health information given and agreed to call with further questions or concerns.

## 2024-10-22 NOTE — LETTER
10/22/2024      RE: Gill Rich  [address]  [facility] [address]       Dear Colleague,    Thank you for the opportunity to participate in the care of your patient, Gill Rich, at the [facility] [place] [facility]. Please see a copy of my visit note below.    CARDIOLOGY CONSULTATION:    Ms. Rich is a delightful 24-year-old woman who is known to me.   She has a history of FBN1 mutation.  We also follow her siblings.  She had been on losartan 25 mg daily but is not taking it consistently.     Gill was fairly active with sports in high school but has not been as active. She does have confirmed family members with FBN1 mutations including her sisters, Kerline and Jenae, and her brother, Cory, her father and 2 paternal uncles.  Her mom has multiple sclerosis.      MRA of the aorta was last done 1/6/2020 and the sinuses measured 2.7 and ascending 2.4 CM. We repeated an MRA 4/2022 and her sinuses are 3.0 and ascending aorta 2.7 CM.  MRA 6/30/23 her sinuses measured 2.8 CM and ascending aorta 3.1 CM, so stable on [id number] comparison.  Echo this visit is stable. BP controlled.      Gill has some depression and anxiety and ADHD.  She has not been able to get her Adderall and that is affecting her ability to function.  She has not tried another medication, such as wellbutrin.  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  She also got a puppy, [employer], that is now 2 years.  She played some softball with colleagues this summer. She likes to read.        PAST MEDICAL HISTORY:  No past medical history on file.    CURRENT MEDICATIONS:  Current Outpatient Medications   Medication Sig Dispense Refill     amphetamine-dextroamphetamine (ADDERALL XR) 20 MG 24 hr capsule Take 1 capsule (20 mg) by mouth daily. Do not start before September 25, 2024. 30 capsule 0     [START ON 10/25/2024] amphetamine-dextroamphetamine (ADDERALL XR) 20 MG 24 hr capsule Take  1 capsule (20 mg) by mouth daily. Do not start before October 25, 2024. 30 capsule 0     buPROPion (WELLBUTRIN XL) 150 MG 24 hr tablet Take 1 tablet (150 mg) by mouth every morning. 90 tablet 3     losartan (COZAAR) 25 MG tablet Take 1 tablet (25 mg) by mouth daily. 90 tablet 3     losartan (COZAAR) 25 MG tablet Take 1 tablet (25 mg) by mouth daily 90 tablet 3       PAST SURGICAL HISTORY:  No past surgical history on file.    ALLERGIES  Methylphenidate derivatives    FAMILY HX:  No family history on file.    SOCIAL HX:  Social History     Socioeconomic History     Marital status: Single     Spouse name: None     Number of children: None     Years of education: None     Highest education level: None   Tobacco Use     Smoking status: Never     Smokeless tobacco: Never   Vaping Use     Vaping status: Never Used   Substance and Sexual Activity     Alcohol use: Yes     Comment: 2-3 drinks a week     Drug use: Never     Sexual activity: Yes     Partners: Male     Social Determinants of Health     Financial Resource Strain: Low Risk  (2/2/2024)    Financial Resource Strain      Within the past 12 months, have you or your family members you live with been unable to get utilities (heat, electricity) when it was really needed?: No   Food Insecurity: Low Risk  (2/2/2024)    Food Insecurity      Within the past 12 months, did you worry that your food would run out before you got money to buy more?: No      Within the past 12 months, did the food you bought just not last and you didn t have money to get more?: No   Transportation Needs: Low Risk  (2/2/2024)    Transportation Needs      Within the past 12 months, has lack of transportation kept you from medical appointments, getting your medicines, non-medical meetings or appointments, work, or from getting things that you need?: No   Housing Stability: Low Risk  (2/2/2024)    Housing Stability      Do you have housing? : Yes      Are you worried about losing your housing?: No  "      ROS:  Constitutional: No fever, chills, or sweats. No weight gain/loss.   ENT: No visual disturbance, ear ache, epistaxis, sore throat.   Allergies/Immunologic: Negative.   Respiratory: No cough, hemoptysis.   Cardiovascular: As per HPI.   GI: No nausea, vomiting, hematemesis, melena, or hematochezia.   : No urinary frequency, dysuria, or hematuria.   Integument: Negative.   Psychiatric: Negative.   Neuro: Negative.   Endocrinology: Negative.   Musculoskeletal: No myalgia.    VITAL SIGNS:  /86 (BP Location: Right arm, Patient Position: Chair, Cuff Size: Adult Regular)   Pulse 60   Wt 85.7 kg (189 lb)   SpO2 100%   BMI 34.34 kg/m    Body mass index is 34.34 kg/m .  Wt Readings from Last 2 Encounters:   10/22/24 85.7 kg (189 lb)   08/08/23 79.5 kg (175 lb 4.8 oz)       PHYSICAL EXAM  Gill Rich IS A 24 year old female.in no acute distress.  HEENT: Unremarkable.  Neck: JVP normal.    Lungs: CTA.  Cor: RRR. Normal S1 and S2.  No murmur, rub, or gallop.   Extremities: No C/C/E.   Neuro: Grossly intact.    LABS    Lab Results   Component Value Date    WBC 5.0 07/27/2018     Lab Results   Component Value Date    RBC 4.26 07/27/2018     Lab Results   Component Value Date    HGB 12.6 07/27/2018     Lab Results   Component Value Date    HCT 38.0 07/27/2018     No components found for: \"MCT\"  Lab Results   Component Value Date    MCV 89 07/27/2018     Lab Results   Component Value Date    MCH 29.6 07/27/2018     Lab Results   Component Value Date    MCHC 33.2 07/27/2018     Lab Results   Component Value Date    RDW 11.9 07/27/2018     Lab Results   Component Value Date     07/27/2018      Recent Labs   Lab Test 05/01/23  1546 02/22/22  1134    140   POTASSIUM 4.1 4.5   CHLORIDE 107 108   CO2 25 28   ANIONGAP 9 4   GLC 85 98   BUN 11 12   CR 0.65 0.70   FRAN 9.6 9.7     Recent Labs   Lab Test 05/01/23  1546 01/03/20  1402   CHOL 189 235*   HDL 78 87   * 136*   TRIG 36 61   NHDL 111 148* "        IMPRESSION, REPORT, PLAN:   1.  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   2.  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  Ascending aorta 2.8 CM -MRA  6/2023 - stable  3.  BMI 35  4.  Depression/Anxiety  5.  ADHD       DISCUSSION:  It was a pleasure being involved in the care of Ms. Rich. We discussed today that she lost her therapist so we placed a referral for a new one.  She has depression/anxiety and we discussed a medication that could help with that and with ADHD such as wellbutrin, and she is willing to start that.   Added some labs to be checked today as well.  Will discuss with her doctor trying Vyvanse in place of Adderall, which she has not been able to get.       Discussed follow up in a year with an echo. She will let us know if there are any issues prior.      All questions were answered.  It was a pleasure to see her.  Please do not hesitate to contact me with any questions or concerns.      MARGARITA HICKS MD    41 minutes face-face, documentation and review of records on day of visit    Please do not hesitate to contact me if you have any questions/concerns.     Sincerely,     Margarita Hciks MD

## 2024-10-22 NOTE — PROGRESS NOTES
CARDIOLOGY CONSULTATION:    Ms. Rich is a delightful 24-year-old woman who is known to me.   She has a history of FBN1 mutation.  We also follow her siblings.  She had been on losartan 25 mg daily but is not taking it consistently.     Gill was fairly active with sports in high school but has not been as active. She does have confirmed family members with FBN1 mutations including her sisters, Kerline and Jenae, and her brother, Cory, her father and 2 paternal uncles.  Her mom has multiple sclerosis.      MRA of the aorta was last done 1/6/2020 and the sinuses measured 2.7 and ascending 2.4 CM. We repeated an MRA 4/2022 and her sinuses are 3.0 and ascending aorta 2.7 CM.  MRA 6/30/23 her sinuses measured 2.8 CM and ascending aorta 3.1 CM, so stable on [id number] comparison.  Echo this visit is stable. BP controlled.      Gill has some depression and anxiety and ADHD.  She has not been able to get her Adderall and that is affecting her ability to function.  She has not tried another medication, such as wellbutrin.  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  She also got a puppy, Solio, that is now 2 years.  She played some softball with colleagues this summer. She likes to read.        PAST MEDICAL HISTORY:  No past medical history on file.    CURRENT MEDICATIONS:  Current Outpatient Medications   Medication Sig Dispense Refill    amphetamine-dextroamphetamine (ADDERALL XR) 20 MG 24 hr capsule Take 1 capsule (20 mg) by mouth daily. Do not start before September 25, 2024. 30 capsule 0    [START ON 10/25/2024] amphetamine-dextroamphetamine (ADDERALL XR) 20 MG 24 hr capsule Take 1 capsule (20 mg) by mouth daily. Do not start before October 25, 2024. 30 capsule 0    buPROPion (WELLBUTRIN XL) 150 MG 24 hr tablet Take 1 tablet (150 mg) by mouth every morning. 90 tablet 3    losartan (COZAAR) 25 MG tablet Take 1 tablet (25 mg) by mouth daily. 90 tablet 3    losartan (COZAAR) 25 MG tablet Take 1 tablet (25 mg) by  mouth daily 90 tablet 3       PAST SURGICAL HISTORY:  No past surgical history on file.    ALLERGIES  Methylphenidate derivatives    FAMILY HX:  No family history on file.    SOCIAL HX:  Social History     Socioeconomic History    Marital status: Single     Spouse name: None    Number of children: None    Years of education: None    Highest education level: None   Tobacco Use    Smoking status: Never    Smokeless tobacco: Never   Vaping Use    Vaping status: Never Used   Substance and Sexual Activity    Alcohol use: Yes     Comment: 2-3 drinks a week    Drug use: Never    Sexual activity: Yes     Partners: Male     Social Determinants of Health     Financial Resource Strain: Low Risk  (2/2/2024)    Financial Resource Strain     Within the past 12 months, have you or your family members you live with been unable to get utilities (heat, electricity) when it was really needed?: No   Food Insecurity: Low Risk  (2/2/2024)    Food Insecurity     Within the past 12 months, did you worry that your food would run out before you got money to buy more?: No     Within the past 12 months, did the food you bought just not last and you didn t have money to get more?: No   Transportation Needs: Low Risk  (2/2/2024)    Transportation Needs     Within the past 12 months, has lack of transportation kept you from medical appointments, getting your medicines, non-medical meetings or appointments, work, or from getting things that you need?: No   Housing Stability: Low Risk  (2/2/2024)    Housing Stability     Do you have housing? : Yes     Are you worried about losing your housing?: No       ROS:  Constitutional: No fever, chills, or sweats. No weight gain/loss.   ENT: No visual disturbance, ear ache, epistaxis, sore throat.   Allergies/Immunologic: Negative.   Respiratory: No cough, hemoptysis.   Cardiovascular: As per HPI.   GI: No nausea, vomiting, hematemesis, melena, or hematochezia.   : No urinary frequency, dysuria, or  "hematuria.   Integument: Negative.   Psychiatric: Negative.   Neuro: Negative.   Endocrinology: Negative.   Musculoskeletal: No myalgia.    VITAL SIGNS:  /86 (BP Location: Right arm, Patient Position: Chair, Cuff Size: Adult Regular)   Pulse 60   Wt 85.7 kg (189 lb)   SpO2 100%   BMI 34.34 kg/m    Body mass index is 34.34 kg/m .  Wt Readings from Last 2 Encounters:   10/22/24 85.7 kg (189 lb)   08/08/23 79.5 kg (175 lb 4.8 oz)       PHYSICAL EXAM  Gill Rich IS A 24 year old female.in no acute distress.  HEENT: Unremarkable.  Neck: JVP normal.    Lungs: CTA.  Cor: RRR. Normal S1 and S2.  No murmur, rub, or gallop.   Extremities: No C/C/E.   Neuro: Grossly intact.    LABS    Lab Results   Component Value Date    WBC 5.0 07/27/2018     Lab Results   Component Value Date    RBC 4.26 07/27/2018     Lab Results   Component Value Date    HGB 12.6 07/27/2018     Lab Results   Component Value Date    HCT 38.0 07/27/2018     No components found for: \"MCT\"  Lab Results   Component Value Date    MCV 89 07/27/2018     Lab Results   Component Value Date    MCH 29.6 07/27/2018     Lab Results   Component Value Date    MCHC 33.2 07/27/2018     Lab Results   Component Value Date    RDW 11.9 07/27/2018     Lab Results   Component Value Date     07/27/2018      Recent Labs   Lab Test 05/01/23  1546 02/22/22  1134    140   POTASSIUM 4.1 4.5   CHLORIDE 107 108   CO2 25 28   ANIONGAP 9 4   GLC 85 98   BUN 11 12   CR 0.65 0.70   FRAN 9.6 9.7     Recent Labs   Lab Test 05/01/23  1546 01/03/20  1402   CHOL 189 235*   HDL 78 87   * 136*   TRIG 36 61   NHDL 111 148*        IMPRESSION, REPORT, PLAN:   1.  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   2.  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  Ascending aorta 2.8 CM -MRA  6/2023 - stable  3.  BMI 35  4.  Depression/Anxiety  5.  ADHD       DISCUSSION:  It was a pleasure being involved in the care of Ms. Rich. We discussed today " that she lost her therapist so we placed a referral for a new one.  She has depression/anxiety and we discussed a medication that could help with that and with ADHD such as wellbutrin, and she is willing to start that.   Added some labs to be checked today as well.  Will discuss with her doctor trying Vyvanse in place of Adderall, which she has not been able to get.       Discussed follow up in a year with an echo. She will let us know if there are any issues prior.      All questions were answered.  It was a pleasure to see her.  Please do not hesitate to contact me with any questions or concerns.      MARGARITA MARTINEZ MD    41 minutes face-face, documentation and review of records on day of visit

## 2024-10-23 LAB
ATRIAL RATE - MUSE: 60 BPM
DIASTOLIC BLOOD PRESSURE - MUSE: NORMAL MMHG
INTERPRETATION ECG - MUSE: NORMAL
P AXIS - MUSE: 52 DEGREES
PR INTERVAL - MUSE: 162 MS
QRS DURATION - MUSE: 94 MS
QT - MUSE: 378 MS
QTC - MUSE: 378 MS
R AXIS - MUSE: 33 DEGREES
SYSTOLIC BLOOD PRESSURE - MUSE: NORMAL MMHG
T AXIS - MUSE: 16 DEGREES
VENTRICULAR RATE- MUSE: 60 BPM

## 2024-11-12 ENCOUNTER — VIRTUAL VISIT (OUTPATIENT)
Dept: FAMILY MEDICINE | Facility: CLINIC | Age: 24
End: 2024-11-12
Payer: COMMERCIAL

## 2024-11-12 DIAGNOSIS — F41.9 ANXIETY: ICD-10-CM

## 2024-11-12 DIAGNOSIS — Z02.89 ENCOUNTER FOR COMPLETION OF FORM WITH PATIENT: ICD-10-CM

## 2024-11-12 DIAGNOSIS — F84.5 ASPERGER'S DISORDER: ICD-10-CM

## 2024-11-12 DIAGNOSIS — F90.9 ATTENTION DEFICIT HYPERACTIVITY DISORDER (ADHD), UNSPECIFIED ADHD TYPE: Primary | ICD-10-CM

## 2024-11-12 PROCEDURE — 99213 OFFICE O/P EST LOW 20 MIN: CPT | Mod: 95

## 2024-11-12 RX ORDER — LISDEXAMFETAMINE DIMESYLATE 50 MG/1
50 CAPSULE ORAL DAILY
Qty: 30 CAPSULE | Refills: 0 | Status: SHIPPED | OUTPATIENT
Start: 2024-11-12 | End: 2024-12-12

## 2024-11-12 RX ORDER — LISDEXAMFETAMINE DIMESYLATE 50 MG/1
50 CAPSULE ORAL DAILY
Qty: 30 CAPSULE | Refills: 0 | Status: SHIPPED | OUTPATIENT
Start: 2024-12-12 | End: 2025-01-11

## 2024-11-12 RX ORDER — LISDEXAMFETAMINE DIMESYLATE 50 MG/1
50 CAPSULE ORAL DAILY
Qty: 30 CAPSULE | Refills: 0 | Status: SHIPPED | OUTPATIENT
Start: 2025-01-11 | End: 2025-02-10

## 2024-11-12 NOTE — LETTER
November 12, 2024      Gill Rich  [address]  [facility] [address]        To Whom It May Concern,     Please allow Gill Rich to have an emotional support dog to help with anxiety. The dog is fully vaccination and Gill can provide any necessary documentation to support this.          Sincerely,        Kerline Lake, DNP, APRN, CNP

## 2024-11-12 NOTE — PROGRESS NOTES
Gill is a 24 year old who is being evaluated via a billable video visit.    How would you like to obtain your AVS? [employer]  If the video visit is dropped, the invitation should be resent by: Text to cell phone: [phone]  Will anyone else be joining your video visit? No      Assessment & Plan     Attention deficit hyperactivity disorder (ADHD), unspecified ADHD type  Has been on Adderall in the past to help control ADHD, has tolerated this very well. Unfortunately, she has been out of medication for over 4 months due to Adderall supply shortage. She is inquiring about switching to Vyvanse to help control symptoms. Pharmacy consulted for assistance in dosing, will try 50 mg of Vyvanse daily. Discussed SE and to notify if she experiences any issues with medication. She will notify if dosing is not appropriate, plan to follow-up in 3 months or sooner if needing any med adjustments.  - lisdexamfetamine (VYVANSE) 50 MG capsule; Take 1 capsule (50 mg) by mouth daily.  - lisdexamfetamine (VYVANSE) 50 MG capsule; Take 1 capsule (50 mg) by mouth daily.  - lisdexamfetamine (VYVANSE) 50 MG capsule; Take 1 capsule (50 mg) by mouth daily.    Encounter for completion of form with patient  Asperger's disorder  Anxiety  Patient requesting letter for apartment to allow emotional support animal (dog) to live with her. Letter filled out and sent to patient via [employer]. She will advise if she needs additional documentation.            FUTURE APPOINTMENTS:       - Follow-up office or E-visit in 3 months for med recheck       - Follow-up for annual visit or as needed    Subjective   Gill is a 24 year old, presenting for the following health issues:  Recheck Medication and Forms (Needs form for apartment to allow emotional support dog)        11/12/2024    11:13 AM   Additional Questions   Roomed by Otilia SALOMON     Wanting to try switching Vyvanse to     History of Present Illness       Reason for visit:  Switch to vyvanse since adderall xr  is out of stock everywhere   She is taking medications regularly.               Review of Systems  Constitutional, HEENT, cardiovascular, pulmonary, gi and gu systems are negative, except as otherwise noted.      Objective           Vitals:  No vitals were obtained today due to virtual visit.    Physical Exam   GENERAL: alert and no distress  EYES: Eyes grossly normal to inspection.  No discharge or erythema, or obvious scleral/conjunctival abnormalities.  RESP: No audible wheeze, cough, or visible cyanosis.    SKIN: Visible skin clear. No significant rash, abnormal pigmentation or lesions.  NEURO: Cranial nerves grossly intact.  Mentation and speech appropriate for age.  PSYCH: Appropriate affect, tone, and pace of words          Video-Visit Details    Type of service:  Video Visit   Originating Location (pt. Location): Other work  Distant Location (provider location):  Off-site  Platform used for Video Visit: Mark  Signed Electronically by: Kerline Lake, DNP, APRN, CNP

## 2024-12-10 ENCOUNTER — TELEPHONE (OUTPATIENT)
Dept: FAMILY MEDICINE | Facility: CLINIC | Age: 24
End: 2024-12-10
Payer: COMMERCIAL

## 2024-12-10 NOTE — TELEPHONE ENCOUNTER
Forms/Letter Request    Type of form/letter: OTHER: Reasonable Accommodation Request-Dog       Do we have the form/letter: Yes: Placed on Kerline Jaya's desk    Who is the form from? Partha Dietrich    Where did/will the form come from? form was mailed in    When is form/letter needed by:     How would you like the form/letter returned: Mail  Is this the correct address?: No -  Address on form  Patient Notified form requests are processed in 5-7 business days:No    Could we send this information to you in sezmi or would you prefer to receive a phone call?:   Patient would prefer a phone call   Okay to leave a detailed message?: Yes at Cell number on file:    Telephone Information:   Mobile [phone]      ----- Message from Alvarez Ulloa DPM sent at 9/3/2018  4:49 PM CDT -----  Regarding: Surgery scheduling  Anesthesia: General  Time: 150 minutes  Procedure/s: 1st metatarsal cuneiform arthrodesis 77138, modified saucedo bunionectomy 62221, possible 1st toe osteotomy 06261, 2nd metatarsal osteotomy 21281, company for implants

## 2024-12-10 NOTE — TELEPHONE ENCOUNTER
LVM for pt to call back and confirm address. Copy in coding folder (purple pod).        Jovanna Haro MA on 12/10/2024 at 10:52 AM

## 2024-12-11 NOTE — TELEPHONE ENCOUNTER
Not sure if original already got mailed.    Made a copy of the copy and mailed with Kerline's letter to nandini Dumont [address], [place] [address]    Copy is back in Purple Pod.     Kendal PERKINS MA

## 2024-12-11 NOTE — TELEPHONE ENCOUNTER
The [facility] address is on the form per the copy in purple pod accordion.    Did original form with letter get mailed yet?      Kendal PERKINS MA

## 2025-07-19 ENCOUNTER — HEALTH MAINTENANCE LETTER (OUTPATIENT)
Age: 25
End: 2025-07-19